# Patient Record
Sex: MALE | Race: WHITE | NOT HISPANIC OR LATINO | Employment: PART TIME | URBAN - METROPOLITAN AREA
[De-identification: names, ages, dates, MRNs, and addresses within clinical notes are randomized per-mention and may not be internally consistent; named-entity substitution may affect disease eponyms.]

---

## 2018-09-18 ENCOUNTER — APPOINTMENT (OUTPATIENT)
Dept: ULTRASOUND IMAGING | Facility: CLINIC | Age: 43
End: 2018-09-18
Attending: FAMILY MEDICINE
Payer: COMMERCIAL

## 2018-09-18 ENCOUNTER — HOSPITAL ENCOUNTER (EMERGENCY)
Facility: CLINIC | Age: 43
Discharge: ANOTHER HOSPITAL | End: 2018-09-18
Attending: FAMILY MEDICINE | Admitting: FAMILY MEDICINE
Payer: COMMERCIAL

## 2018-09-18 VITALS
TEMPERATURE: 98.1 F | DIASTOLIC BLOOD PRESSURE: 82 MMHG | SYSTOLIC BLOOD PRESSURE: 145 MMHG | HEART RATE: 60 BPM | WEIGHT: 228 LBS | RESPIRATION RATE: 18 BRPM | OXYGEN SATURATION: 93 % | BODY MASS INDEX: 34.67 KG/M2

## 2018-09-18 DIAGNOSIS — K80.42 CHOLEDOCHOLITHIASIS WITH ACUTE CHOLECYSTITIS: ICD-10-CM

## 2018-09-18 DIAGNOSIS — R10.13 ABDOMINAL PAIN, EPIGASTRIC: ICD-10-CM

## 2018-09-18 DIAGNOSIS — K85.10 ACUTE GALLSTONE PANCREATITIS: ICD-10-CM

## 2018-09-18 LAB
ALBUMIN SERPL-MCNC: 4.2 G/DL (ref 3.4–5)
ALP SERPL-CCNC: 156 U/L (ref 40–150)
ALT SERPL W P-5'-P-CCNC: 188 U/L (ref 0–70)
ANION GAP SERPL CALCULATED.3IONS-SCNC: 9 MMOL/L (ref 3–14)
AST SERPL W P-5'-P-CCNC: 62 U/L (ref 0–45)
BASOPHILS # BLD AUTO: 0.1 10E9/L (ref 0–0.2)
BASOPHILS NFR BLD AUTO: 0.3 %
BILIRUB SERPL-MCNC: 2.4 MG/DL (ref 0.2–1.3)
BUN SERPL-MCNC: 15 MG/DL (ref 7–30)
CALCIUM SERPL-MCNC: 8.9 MG/DL (ref 8.5–10.1)
CHLORIDE SERPL-SCNC: 104 MMOL/L (ref 94–109)
CO2 SERPL-SCNC: 27 MMOL/L (ref 20–32)
CREAT SERPL-MCNC: 0.93 MG/DL (ref 0.66–1.25)
DIFFERENTIAL METHOD BLD: ABNORMAL
EOSINOPHIL NFR BLD AUTO: 1 %
ERYTHROCYTE [DISTWIDTH] IN BLOOD BY AUTOMATED COUNT: 12.2 % (ref 10–15)
GFR SERPL CREATININE-BSD FRML MDRD: 88 ML/MIN/1.7M2
GLUCOSE SERPL-MCNC: 133 MG/DL (ref 70–99)
HCT VFR BLD AUTO: 53.6 % (ref 40–53)
HGB BLD-MCNC: 18.5 G/DL (ref 13.3–17.7)
IMM GRANULOCYTES # BLD: 0.1 10E9/L (ref 0–0.4)
IMM GRANULOCYTES NFR BLD: 0.4 %
LIPASE SERPL-CCNC: ABNORMAL U/L (ref 73–393)
LYMPHOCYTES # BLD AUTO: 2 10E9/L (ref 0.8–5.3)
LYMPHOCYTES NFR BLD AUTO: 9.8 %
MCH RBC QN AUTO: 29.6 PG (ref 26.5–33)
MCHC RBC AUTO-ENTMCNC: 34.5 G/DL (ref 31.5–36.5)
MCV RBC AUTO: 86 FL (ref 78–100)
MONOCYTES # BLD AUTO: 0.3 10E9/L (ref 0–1.3)
MONOCYTES NFR BLD AUTO: 1.4 %
NEUTROPHILS # BLD AUTO: 17.9 10E9/L (ref 1.6–8.3)
NEUTROPHILS NFR BLD AUTO: 87.1 %
NRBC # BLD AUTO: 0 10*3/UL
NRBC BLD AUTO-RTO: 0 /100
PLATELET # BLD AUTO: 295 10E9/L (ref 150–450)
POTASSIUM SERPL-SCNC: 3.7 MMOL/L (ref 3.4–5.3)
PROT SERPL-MCNC: 8 G/DL (ref 6.8–8.8)
RBC # BLD AUTO: 6.24 10E12/L (ref 4.4–5.9)
SODIUM SERPL-SCNC: 140 MMOL/L (ref 133–144)
WBC # BLD AUTO: 20.5 10E9/L (ref 4–11)

## 2018-09-18 PROCEDURE — 96375 TX/PRO/DX INJ NEW DRUG ADDON: CPT | Performed by: FAMILY MEDICINE

## 2018-09-18 PROCEDURE — 83690 ASSAY OF LIPASE: CPT | Performed by: FAMILY MEDICINE

## 2018-09-18 PROCEDURE — 99285 EMERGENCY DEPT VISIT HI MDM: CPT | Mod: 25 | Performed by: FAMILY MEDICINE

## 2018-09-18 PROCEDURE — 99285 EMERGENCY DEPT VISIT HI MDM: CPT | Mod: Z6 | Performed by: FAMILY MEDICINE

## 2018-09-18 PROCEDURE — 25000128 H RX IP 250 OP 636: Performed by: FAMILY MEDICINE

## 2018-09-18 PROCEDURE — 96376 TX/PRO/DX INJ SAME DRUG ADON: CPT | Performed by: FAMILY MEDICINE

## 2018-09-18 PROCEDURE — 85025 COMPLETE CBC W/AUTO DIFF WBC: CPT | Performed by: FAMILY MEDICINE

## 2018-09-18 PROCEDURE — 96361 HYDRATE IV INFUSION ADD-ON: CPT | Performed by: FAMILY MEDICINE

## 2018-09-18 PROCEDURE — 76705 ECHO EXAM OF ABDOMEN: CPT

## 2018-09-18 PROCEDURE — 96365 THER/PROPH/DIAG IV INF INIT: CPT | Performed by: FAMILY MEDICINE

## 2018-09-18 PROCEDURE — 80053 COMPREHEN METABOLIC PANEL: CPT | Performed by: FAMILY MEDICINE

## 2018-09-18 PROCEDURE — 96374 THER/PROPH/DIAG INJ IV PUSH: CPT | Performed by: FAMILY MEDICINE

## 2018-09-18 RX ORDER — HYDROMORPHONE HYDROCHLORIDE 1 MG/ML
0.5 INJECTION, SOLUTION INTRAMUSCULAR; INTRAVENOUS; SUBCUTANEOUS
Status: DISCONTINUED | OUTPATIENT
Start: 2018-09-18 | End: 2018-09-18 | Stop reason: HOSPADM

## 2018-09-18 RX ORDER — HYDROMORPHONE HYDROCHLORIDE 1 MG/ML
0.5 INJECTION, SOLUTION INTRAMUSCULAR; INTRAVENOUS; SUBCUTANEOUS
Status: COMPLETED | OUTPATIENT
Start: 2018-09-18 | End: 2018-09-18

## 2018-09-18 RX ORDER — ONDANSETRON 2 MG/ML
4 INJECTION INTRAMUSCULAR; INTRAVENOUS EVERY 30 MIN PRN
Status: COMPLETED | OUTPATIENT
Start: 2018-09-18 | End: 2018-09-18

## 2018-09-18 RX ORDER — KETOROLAC TROMETHAMINE 30 MG/ML
30 INJECTION, SOLUTION INTRAMUSCULAR; INTRAVENOUS ONCE
Status: COMPLETED | OUTPATIENT
Start: 2018-09-18 | End: 2018-09-18

## 2018-09-18 RX ADMIN — Medication 0.5 MG: at 19:52

## 2018-09-18 RX ADMIN — Medication 0.5 MG: at 17:44

## 2018-09-18 RX ADMIN — Medication 0.5 MG: at 17:13

## 2018-09-18 RX ADMIN — ONDANSETRON 4 MG: 2 INJECTION INTRAMUSCULAR; INTRAVENOUS at 17:03

## 2018-09-18 RX ADMIN — SODIUM CHLORIDE 1000 ML: 9 INJECTION, SOLUTION INTRAVENOUS at 14:19

## 2018-09-18 RX ADMIN — SODIUM CHLORIDE 1000 ML: 9 INJECTION, SOLUTION INTRAVENOUS at 17:47

## 2018-09-18 RX ADMIN — KETOROLAC TROMETHAMINE 30 MG: 30 INJECTION, SOLUTION INTRAMUSCULAR at 14:22

## 2018-09-18 RX ADMIN — PIPERACILLIN SODIUM,TAZOBACTAM SODIUM 4.5 G: 4; .5 INJECTION, POWDER, FOR SOLUTION INTRAVENOUS at 17:05

## 2018-09-18 RX ADMIN — Medication 0.5 MG: at 18:27

## 2018-09-18 RX ADMIN — ONDANSETRON 4 MG: 2 INJECTION INTRAMUSCULAR; INTRAVENOUS at 14:20

## 2018-09-18 NOTE — ED TRIAGE NOTES
"Pt c/o sever abd pain that started 4 hours ago.  Is getting worse.  States \"I think my gallbladder burst\".  Was told he need to have it removed years ago.  Pt also c/o N/V.  Pt pale, diaphoretic at triage.   "

## 2018-09-18 NOTE — ED NOTES
Pt resting on cart with SO at bedside.  Pt states his abd pain is a little better but would like additional dose.

## 2018-09-18 NOTE — ED PROVIDER NOTES
"                                                            [facility] ED Provider Note   CC:     Chief Complaint   Patient presents with     Abdominal Pain     HPI:  Clint Alvarez is a 43 year old male who presented to the emergency department with severe abdominal pain that started approximately 4 hours ago, with severe pain, nausea and vomiting ×4.  Patient was pale and diaphoretic at triage.  He was able to report that he had 3-4 attacks this past week.  He has a known history of gallbladder disease based on the previous ultrasound from 2012.  He has had increasing episodes over the last several months now.  Patient states that he does not go to the doctor often, and does not know whether he has a history of diabetes, hypertension, hyperlipidemia, etc.  He has had no previous abdominal surgeries.  He is a smoker of three-quarter packs of cigarettes per day and denies any alcohol or illicit drug use.  Patient's ultrasound from 2012 reveals cholelithiasis with gallbladder wall thickening and questionable small amount of pericholecystic fluid.  Patient reports that his bowel movements have been slightly watery today.  There has been no melena or hematochezia.  He rates his current pain level \"the worst it has ever been.\"  Patient describes pain across the epigastrium, wrapping around into the back.  There is been no alleviating or aggravating factors.  He had a bagel this morning before the symptoms started.    Problem List:  There are no active problems to display for this patient.      MEDS:   Previous Medications    No medications on file       ALLERGIES:    Allergies   Allergen Reactions     No Known Drug Allergy        Past Surgical History:   Procedure Laterality Date     INSERT CHEST TUBE      1994       Social History   Substance Use Topics     Smoking status: Current Every Day Smoker     Packs/day: 0.50     Years: 20.00     Smokeless tobacco: Never Used     Alcohol use No         Review of " Systems   Except as noted in HPI, all other systems were reviewed and are negative    Physical Exam     Vitals were reviewed  Patient Vitals for the past 8 hrs:   BP Temp Temp src Pulse Resp SpO2 Weight   09/18/18 1532 118/76 - - 60 18 100 % -   09/18/18 1347 119/78 98.1  F (36.7  C) Oral 64 20 100 % 103.4 kg (228 lb)     GENERAL APPEARANCE: Moderate to severe distress due to severe active vomiting  FACE: normal facies  EYES: Pupils are equal; sclerae nonicteric  HENT: normal external exam  NECK: no adenopathy or asymmetry  RESP: normal respiratory effort; clear breath sounds bilaterally  CV: regular rate and rhythm; no significant murmurs, gallops or rubs  ABD: soft, obese, epigastric and right upper quadrant tenderness; no rebound or guarding; bowel sounds are normal  MS: no gross deformities noted; normal muscle tone.  EXT: No calf tenderness or pitting edema  SKIN: no worrisome rash  NEURO: no facial droop; no focal deficits, speech is normal  PSYCH: normal mood and affect      Available Lab/Imaging Results     Results for orders placed or performed during the hospital encounter of 09/18/18 (from the past 24 hour(s))   CBC with platelets differential   Result Value Ref Range    WBC 20.5 (H) 4.0 - 11.0 10e9/L    RBC Count 6.24 (H) 4.4 - 5.9 10e12/L    Hemoglobin 18.5 (H) 13.3 - 17.7 g/dL    Hematocrit 53.6 (H) 40.0 - 53.0 %    MCV 86 78 - 100 fl    MCH 29.6 26.5 - 33.0 pg    MCHC 34.5 31.5 - 36.5 g/dL    RDW 12.2 10.0 - 15.0 %    Platelet Count 295 150 - 450 10e9/L    Diff Method Automated Method     % Neutrophils 87.1 %    % Lymphocytes 9.8 %    % Monocytes 1.4 %    % Eosinophils 1.0 %    % Basophils 0.3 %    % Immature Granulocytes 0.4 %    Nucleated RBCs 0 0 /100    Absolute Neutrophil 17.9 (H) 1.6 - 8.3 10e9/L    Absolute Lymphocytes 2.0 0.8 - 5.3 10e9/L    Absolute Monocytes 0.3 0.0 - 1.3 10e9/L    Absolute Basophils 0.1 0.0 - 0.2 10e9/L    Abs Immature Granulocytes 0.1 0 - 0.4 10e9/L    Absolute Nucleated  RBC 0.0    Comprehensive metabolic panel   Result Value Ref Range    Sodium 140 133 - 144 mmol/L    Potassium 3.7 3.4 - 5.3 mmol/L    Chloride 104 94 - 109 mmol/L    Carbon Dioxide 27 20 - 32 mmol/L    Anion Gap 9 3 - 14 mmol/L    Glucose 133 (H) 70 - 99 mg/dL    Urea Nitrogen 15 7 - 30 mg/dL    Creatinine 0.93 0.66 - 1.25 mg/dL    GFR Estimate 88 >60 mL/min/1.7m2    GFR Estimate If Black >90 >60 mL/min/1.7m2    Calcium 8.9 8.5 - 10.1 mg/dL    Bilirubin Total 2.4 (H) 0.2 - 1.3 mg/dL    Albumin 4.2 3.4 - 5.0 g/dL    Protein Total 8.0 6.8 - 8.8 g/dL    Alkaline Phosphatase 156 (H) 40 - 150 U/L     (H) 0 - 70 U/L    AST 62 (H) 0 - 45 U/L   Lipase   Result Value Ref Range    Lipase 31544 (H) 73 - 393 U/L   US Abdomen Limited    Narrative    LIMITED ABDOMINAL ULTRASOUND  9/18/2018 3:59 PM     HISTORY: Right upper quadrant abdominal pain.     COMPARISON: None.    FINDINGS: This study is significantly limited due to difficulty  penetrating through the liver due to fatty liver, due to patient body  habitus and due to bowel gas.   Gallbladder: There is asymmetric thickening of the wall of the  gallbladder measuring up to 0.6 cm in thickness. It is somewhat  irregular. There is a calculus in the gallbladder neck dependently  located measuring up to 1.2 cm. This does appear to cause some  shadowing, but the technologist states that it was not significant  shadowing. Small polyps are noted along the posterior wall of the  gallbladder do not cause significant shadowing. These measure less  than 0.3 cm. Small nonshadowing calculus is seen in the gallbladder  neck measuring up to 0.6 cm. No pericholecystic fluid is identified.  No definite sonographic Rucker sign is seen. Patient described pain  over entire abdomen during this examination. Patient was on pain  medications at the time of the exam.    Bile ducts:  Common bile duct is somewhat difficult to see, but  measures up to 1.0 cm. No definite choledocholithiasis is  identified.    Liver: There is diffuse fatty infiltration of the liver. Liver is  limited in evaluation due to limitations described above.    Pancreas:  Completely obscured and could not be evaluated.    Right kidney:  Right renal contour is mildly lobulated which could  represent fetal lobulation or chronic scarring. Right kidney is  otherwise normal in appearance.    Aorta and Proximal IVC: Partially obscured. Visualized portions of the  abdominal aorta and proximal inferior vena cava are grossly  unremarkable.      Impression    IMPRESSION:   1. Irregular wall thickening of the gallbladder, cholelithiasis and  probable gallbladder polyps as described above. No definite  sonographic Rucker sign or pericholecystic fluid to confirm acute  cholecystitis. Patient described pain over the entire upper abdomen  during the exam. This was not specifically limited to the region of  the gallbladder. Gallbladder wall thickening is a differential  diagnosis including acute cholecystitis, hypoalbuminemia, congestive  heart failure, hepatitis as well as others. Recommend clinical  correlation.  2. Diffuse fatty infiltration of the liver.  3. Limited exam due to patient body habitus, decreased penetration of  the liver (fatty infiltration) and bowel gas. Pancreas is completely  obscured and could not be evaluated.    I discussed the abnormal findings in the gallbladder, fatty  infiltration of the liver and limitations of this study with Dr. Catherine  on 9/18/2018 at approximately 4:20 PM.                Impression     Final diagnoses:   Abdominal pain, epigastric   Choledocholithiasis with acute cholecystitis   Acute gallstone pancreatitis         ED Course & Medical Decision Making   Clint Alvarez is a 43 year old male who presented to the emergency department with acute onset of epigastric and right upper quadrant pain that started 4 hours ago.  Patient reports that this is the most severe pain he has ever had.  He has had 3-4  episodes over the past week.  Patient states that he has had increasing episodes over the last several months, with a known history of gallbladder disease.  He had an ultrasound that was documented in our system in 2012, and at that time he had known cholelithiasis with suspected acute cholecystitis with some gallbladder wall thickening.  He reported that he was doing well for some time, but his symptoms started to recur periodically.  Over the last several months symptoms have been occurring more randomly.  Patient was seen shortly after arrival.  Temp is 98.1 with blood pressure 119/78.  Patient appear to be in severe distress with active nausea and vomiting.  He threw up some yellow stomach fluid.  Patient has severe epigastric and right upper quadrant tenderness.  His workup reveals an elevated white blood count of 20.5 with 87% neutrophils.  Hemoglobin is 18.5.  Competency metabolic panel reveals normal electrolytes, elevated glucose of 133, total bilirubin of 2.4, ALT of 188 and AST of 62.  Patient's lipase level is 91,148.  Right upper quadrant ultrasound was performed and reveals an irregular wall thickening of the gallbladder, cholelithiasis and probable gallbladder polyps.  There is a calculus in the gallbladder neck dependently located measuring up to 1.2 cm.  The asymmetric thickening of the wall of the gallbladder measures up to 0.6 cm.  There is a small nonshadowing calculus in the gallbladder neck measuring up to 0.6 cm.  The common bile duct is dilated and measures up to 1.0 cm.  Patient is diffuse fatty infiltration of the liver.  The exam is overall limited due to the patient's body habitus and decreased penetration of the liver.  Patient was started on Zosyn for suspected acute cholecystitis in the setting of choledocholithiasis and gallstone pancreatitis.  I discussed the case with Dr. Nelson and he recommended that we transfer the patient for possible ERCP.  I discussed with the patient options  of transfer including the [facility] with whom we are affiliated with or even [facility].  Patient lives in [place] and wanted to go to a closer hospital.  Unfortunately, [facility] was his first choice, and they are on divert for adult patients.  I reviewed the options again with the patient and he wanted us to try to contact [facility] next.  Patient is getting redosed on Zofran due to recurrent nausea when he got up.  Patient is received Toradol, IV fluids, Zofran and Dilaudid.  He is currently receiving Zosyn.    5:56 PM: I discussed the case with Dr. Whit Mai, hospitalist at [facility], who has accepted care for the patient.  She wanted the patient to continue with 500 mL's per hour of normal saline.  Patient will be transferred by ALS ambulance shortly to their MedSurg unit.         At [facility] ED to [facility] inpatient Handoff:    Discussed with Dr. Whit Mai at [facility]  Patient accepted for Inpatient Stay  Pending studies include None; IV fluids, Zofran, IV Dilaudid; Zosyn 4.5 g infusing  Code Status: Full Code         Medications   piperacillin-tazobactam (ZOSYN) 4.5 g in sodium chloride 0.9 % 100 mL intermittent infusion (4.5 g Intravenous New Bag 9/18/18 1705)   HYDROmorphone (PF) (DILAUDID) injection 0.5 mg (0.5 mg Intravenous Given 9/18/18 1744)   0.9% sodium chloride BOLUS (0 mLs Intravenous Stopped 9/18/18 1532)   ketorolac (TORADOL) injection 30 mg (30 mg Intravenous Given 9/18/18 1422)   ondansetron (ZOFRAN) injection 4 mg (4 mg Intravenous Given 9/18/18 1703)   0.9% sodium chloride BOLUS (1,000 mLs Intravenous New Bag 9/18/18 1747)            This note was dictated using electronic voice recognition software and although reviewed, may contain grammatical and spelling errors.        Cora Catherine MD  09/18/18 [id number]

## 2018-09-18 NOTE — ED NOTES
Pt up to the bathroom via wheelchair.  Pt had return of nausea, but did not vomit.  Pt also reports worsening of pain.

## 2018-10-11 ENCOUNTER — TELEPHONE (OUTPATIENT)
Dept: FAMILY MEDICINE | Facility: OTHER | Age: 43
End: 2018-10-11

## 2018-10-11 ENCOUNTER — OFFICE VISIT (OUTPATIENT)
Dept: FAMILY MEDICINE | Facility: OTHER | Age: 43
End: 2018-10-11
Payer: COMMERCIAL

## 2018-10-11 VITALS
BODY MASS INDEX: 35.61 KG/M2 | SYSTOLIC BLOOD PRESSURE: 130 MMHG | OXYGEN SATURATION: 95 % | HEIGHT: 67 IN | DIASTOLIC BLOOD PRESSURE: 80 MMHG | TEMPERATURE: 98.5 F | HEART RATE: 80 BPM | WEIGHT: 226.9 LBS | RESPIRATION RATE: 20 BRPM

## 2018-10-11 DIAGNOSIS — R53.83 FATIGUE, UNSPECIFIED TYPE: ICD-10-CM

## 2018-10-11 DIAGNOSIS — L20.89 OTHER ATOPIC DERMATITIS: Primary | ICD-10-CM

## 2018-10-11 DIAGNOSIS — L20.9 ATOPIC DERMATITIS, UNSPECIFIED TYPE: Primary | ICD-10-CM

## 2018-10-11 LAB
ANION GAP SERPL CALCULATED.3IONS-SCNC: 8 MMOL/L (ref 3–14)
BASOPHILS # BLD AUTO: 0.1 10E9/L (ref 0–0.2)
BASOPHILS NFR BLD AUTO: 1 %
BUN SERPL-MCNC: 15 MG/DL (ref 7–30)
CALCIUM SERPL-MCNC: 8.6 MG/DL (ref 8.5–10.1)
CHLORIDE SERPL-SCNC: 105 MMOL/L (ref 94–109)
CO2 SERPL-SCNC: 28 MMOL/L (ref 20–32)
CREAT SERPL-MCNC: 0.9 MG/DL (ref 0.66–1.25)
DIFFERENTIAL METHOD BLD: NORMAL
EOSINOPHIL # BLD AUTO: 0.4 10E9/L (ref 0–0.7)
EOSINOPHIL NFR BLD AUTO: 3.9 %
ERYTHROCYTE [DISTWIDTH] IN BLOOD BY AUTOMATED COUNT: 12.4 % (ref 10–15)
GFR SERPL CREATININE-BSD FRML MDRD: >90 ML/MIN/1.7M2
GLUCOSE SERPL-MCNC: 121 MG/DL (ref 70–99)
HBA1C MFR BLD: 5.7 % (ref 0–5.6)
HCT VFR BLD AUTO: 42.5 % (ref 40–53)
HGB BLD-MCNC: 14.5 G/DL (ref 13.3–17.7)
LYMPHOCYTES # BLD AUTO: 2.9 10E9/L (ref 0.8–5.3)
LYMPHOCYTES NFR BLD AUTO: 28.6 %
MCH RBC QN AUTO: 29.1 PG (ref 26.5–33)
MCHC RBC AUTO-ENTMCNC: 34.1 G/DL (ref 31.5–36.5)
MCV RBC AUTO: 85 FL (ref 78–100)
MONOCYTES # BLD AUTO: 1 10E9/L (ref 0–1.3)
MONOCYTES NFR BLD AUTO: 9.5 %
NEUTROPHILS # BLD AUTO: 5.8 10E9/L (ref 1.6–8.3)
NEUTROPHILS NFR BLD AUTO: 57 %
PLATELET # BLD AUTO: 399 10E9/L (ref 150–450)
POTASSIUM SERPL-SCNC: 3.9 MMOL/L (ref 3.4–5.3)
RBC # BLD AUTO: 4.99 10E12/L (ref 4.4–5.9)
SODIUM SERPL-SCNC: 141 MMOL/L (ref 133–144)
WBC # BLD AUTO: 10.2 10E9/L (ref 4–11)

## 2018-10-11 PROCEDURE — 83036 HEMOGLOBIN GLYCOSYLATED A1C: CPT | Performed by: PHYSICIAN ASSISTANT

## 2018-10-11 PROCEDURE — 80048 BASIC METABOLIC PNL TOTAL CA: CPT | Performed by: PHYSICIAN ASSISTANT

## 2018-10-11 PROCEDURE — 99213 OFFICE O/P EST LOW 20 MIN: CPT | Performed by: PHYSICIAN ASSISTANT

## 2018-10-11 PROCEDURE — 36415 COLL VENOUS BLD VENIPUNCTURE: CPT | Performed by: PHYSICIAN ASSISTANT

## 2018-10-11 PROCEDURE — 85025 COMPLETE CBC W/AUTO DIFF WBC: CPT | Performed by: PHYSICIAN ASSISTANT

## 2018-10-11 RX ORDER — CIPROFLOXACIN HYDROCHLORIDE 3.5 MG/ML
SOLUTION/ DROPS TOPICAL
Qty: 1 BOTTLE | Refills: 0 | Status: SHIPPED | OUTPATIENT
Start: 2018-10-11 | End: 2018-10-26

## 2018-10-11 RX ORDER — BENZOCAINE/MENTHOL 6 MG-10 MG
LOZENGE MUCOUS MEMBRANE
Qty: 30 G | Refills: 0 | Status: SHIPPED | OUTPATIENT
Start: 2018-10-11 | End: 2019-08-20

## 2018-10-11 RX ORDER — CIPROFLOXACIN AND DEXAMETHASONE 3; 1 MG/ML; MG/ML
4 SUSPENSION/ DROPS AURICULAR (OTIC) 2 TIMES DAILY
Qty: 2.5 ML | Refills: 0 | Status: SHIPPED | OUTPATIENT
Start: 2018-10-11 | End: 2019-02-28

## 2018-10-11 ASSESSMENT — PAIN SCALES - GENERAL: PAINLEVEL: MILD PAIN (3)

## 2018-10-11 NOTE — PATIENT INSTRUCTIONS
1. Cetirizine, fexofenadine or Loratadine 1 tablet daily for itching  2. Hydrocortisone cream applied outside of ears sparingly twice daily  3. Ciprodex 1 drop in each ear twice daily for 7 days for itching     Atopic Dermatitis (Adult)  Atopic dermatitis is a dry, itchy, red rash. It s also called eczema. The rash is chronic, or ongoing. It can come and go over time. The disease is often passed down in families. It causes a problem with the skin barrier that makes the skin more sensitive to the environment and other factors. The increased skin sensitivity causes an itch, which causes scratching. Scratching can worsen the itching or also break the skin. This can put the skin at risk of infection.  The condition is most common in people with asthma, hay fever, hives, or dry or sensitive skin. The rash may be caused by extreme heat or heavy sweating. Skin irritants can cause the rash to flare up. These can include wool or silk clothing, grease, oils, some medicines, and harsh soaps and detergents. Emotional stress can also be a trigger.  Treatment is done to relieve the itching and inflammation of the skin.  Home care  Follow these tips to care for your condition:    Keep the areas of rash clean by bathing at least every other day. Use lukewarm water to bathe. Don t use hot water, which can dry out the skin.    Don t use soaps with strong detergents. Use mild soaps made for sensitive skin.    Apply a cream or ointment to damp skin right after bathing.    Avoid things that irritate your skin. Wear absorbent, soft fabrics next to the skin rather than rough or scratchy materials.    Use mild laundry soap free of scents and perfumes. Make sure to rinse all the soap out of your clothes.    Treat any skin infection as directed.    Use oral diphenhydramine to help reduce itching. This is an antihistamine you can buy at drug and grocery stores. It can make you sleepy, so use lower doses during the daytime. Or you can use  loratadine. This is an antihistamine that will not make you sleepy. Do not use diphenhydramine if you have glaucoma or have trouble urinating due to an enlarged prostate.  Follow-up care  See your healthcare provider, or as advised. If your symptoms don t get better or if they get worse in the next 7 days, make an appointment with your healthcare provider.  When to seek medical advice  Call your healthcare provider right away  if any of these occur:    Increasing area of redness or pain in the skin    Yellow crusts or wet drainage from the rash    Fever of 100.4 F (38 C) or higher, or as directed by your healthcare provider  Date Last Reviewed: 9/1/2016 2000-2017 [employer]. [address] [place] [address], [place] [address]. All rights reserved. This information is not intended as a substitute for professional medical care. Always follow your healthcare professional's instructions.        Managing Atopic Dermatitis (Eczema)     After bathing, gently pat your skin dry (don t rub). Apply moisturizer while your skin is still damp.   To manage your symptoms and help reduce the severity and frequency, try these self-care tips:  Caring for your skin    Use a gentle, fragrance-free cleanser (or nonsoap cleanser) for bathing. Rinse well. Pat skin dry.    Take warm, not hot, baths or showers. Try to limit them to no more that 10 to 15 minutes.     Use moisturizer liberally right after you bathe, while your skin is still damp.    Avoid scratching because it will cause more damage to your skin.     Topical, over-the-counter hydrocortisone cream may help control mild symptoms.   Controlling your environment    Avoid extreme heat or cold.    Avoid very humid or very dry air.    If your home or office air is very dry, use a humidifier.    Avoid allergens, such as dust, that may be present in bedding, carpets, plush toys, or rugs.    Know that pet hair and dander can cause flare-ups.  Seeking medical treatment  Another  way to keep symptoms under control is to seek medical treatment. Talk with your healthcare provider about the type of treatment that may work best for you. Your provider may prescribe treatments such as the following:    Topical treatments to put on the skin daily    Medicines taken by mouth (oral medicines), such as antihistamines, antibiotics, or corticosteroids    In severe cases shots (injections) may be needed to control the symptoms. You may even need antibiotics if skin infections occur.  Treatments don t work the same way for every person. So if your symptoms continue or get worse, ask your healthcare provider about other treatments.  Making lifestyle choices    Manage the stress in your life.    Wear loose-fitting cotton clothing that does not bind or rub your skin.    Avoid contact with wool or other scratchy fabrics.    Use fragrance-free products.  Getting good results  Now that you know more about atopic dermatitis, the next step is up to you. Follow your healthcare provider s treatment plan and your self-care routine. This will help bring atopic dermatitis under control. If your symptoms persist, be sure to let your health care provider know.   Date Last Reviewed: 2/1/2017 2000-2017 [employer]. [address] [place] [id number]. All rights reserved. This information is not intended as a substitute for professional medical care. Always follow your healthcare professional's instructions.

## 2018-10-11 NOTE — PROGRESS NOTES
SUBJECTIVE:   Clint Alvarez is a 43 year old male who presents to clinic today for the following health issues:      Rash  Onset: 2-3 months    Description:   Location: behind bilateral ears  Character: blotchy, raised, draining, red  Itching (Pruritis): YES    Progression of Symptoms:  worsening    Accompanying Signs & Symptoms:  Fever: no   Body aches or joint pain: no   Sore throat symptoms: no   Recent cold symptoms: no     History:   Previous similar rash: no     Precipitating factors:   Exposure to similar rash: no   New exposures: None   Recent travel: no     Alleviating factors:  nothing    Therapies Tried and outcome: lotion; no relief    Patient is a 43 year old male who presents with his significant other to discuss ongoing concerns about rash behind his ears and itching within the canals. He said that the rash has waxed/waned for 2-3 months. At times is has weeped/drained and others felt bumpy. He admits to itching the insides of his ears with car keys or a qtip. I discouraged this as it can increase the risk for infection or trauma. Denies changes in hearing, fever, recent illness, rash elsewhere, exposure to chemicals or new cleaning materials.     Problem list and histories reviewed & adjusted, as indicated.  Additional history: as documented    There is no problem list on file for this patient.    Past Surgical History:   Procedure Laterality Date     INSERT CHEST TUBE      1994       Social History   Substance Use Topics     Smoking status: Former Smoker     Packs/day: 0.50     Years: 20.00     Quit date: 9/18/2018     Smokeless tobacco: Never Used     Alcohol use No     History reviewed. No pertinent family history.      Current Outpatient Prescriptions   Medication Sig Dispense Refill     ciprofloxacin-dexamethasone (CIPRODEX) otic suspension Place 4 drops into both ears 2 times daily for 7 days 2.5 mL 0     hydrocortisone (CORTAID) 1 % cream Apply sparingly to affected area twice daily for  "14 days. 30 g 0     ciprofloxacin (CILOXAN) 0.3 % ophthalmic solution 1 drop each ear daily for 5 days 1 Bottle 0     dexamethasone (DECADRON) 0.1 % ophthalmic susp 1 drop in each ear daily for 5 days 5 mL 0     Allergies   Allergen Reactions     No Known Drug Allergy        Reviewed and updated as needed this visit by clinical staff  Tobacco  Allergies  Meds  Med Hx  Surg Hx  Fam Hx  Soc Hx      Reviewed and updated as needed this visit by Provider         ROS:  Constitutional, HEENT, cardiovascular, pulmonary, gi and gu systems are negative, except as otherwise noted.    OBJECTIVE:     /80 (BP Location: Right arm, Patient Position: Chair, Cuff Size: Adult Large)  Pulse 80  Temp 98.5  F (36.9  C) (Oral)  Resp 20  Ht 5' 6.75\" (1.695 m)  Wt 226 lb 14.4 oz (102.9 kg)  SpO2 95%  BMI 35.8 kg/m2  Body mass index is 35.8 kg/(m^2).  GENERAL: healthy, alert and no distress  HENT: ear canals erythematous and flaky and TM's normal, nose and mouth without ulcers or lesions  NECK: no adenopathy, no asymmetry, masses, or scars and thyroid normal to palpation  RESP: lungs clear to auscultation - no rales, rhonchi or wheezes  CV: regular rate and rhythm, normal S1 S2, no S3 or S4, no murmur, click or rub, no peripheral edema and peripheral pulses strong  SKIN: fissured, crusted skin behind ears without obvious signs of drainage, nontender and cool to touch  NEURO: Normal strength and tone, mentation intact and speech normal  PSYCH: mentation appears normal, affect normal/bright    Diagnostic Test Results:  none     ASSESSMENT/PLAN:   1. Atopic dermatitis, unspecified type  Suspect the patient's rash is due to eczematous reaction. I recommended use of topical steroid twice daily for next 2 weeks and drops in ears. Patient also encouraged to begin taking antihistamine to reduce itching.   - ciprofloxacin-dexamethasone (CIPRODEX) otic suspension; Place 4 drops into both ears 2 times daily for 7 days  Dispense: 2.5 " mL; Refill: 0  - hydrocortisone (CORTAID) 1 % cream; Apply sparingly to affected area twice daily for 14 days.  Dispense: 30 g; Refill: 0    2. Fatigue, unspecified type  We will check labs today to ensure no alternative cause for fatigue.   - CBC with platelets differential  - Basic metabolic panel  (Ca, Cl, CO2, Creat, Gluc, K, Na, BUN)  - Hemoglobin A1c    Follow up with clinic as needed or sooner if conditions change, worsen or fail to improve as expected.      Israel Matthews PA-C  [facility]

## 2018-10-11 NOTE — NURSING NOTE
Health Maintenance Due   Topic Date Due     PHQ-2 Q1 YR  07/07/1985     TETANUS IMMUNIZATION (SYSTEM ASSIGNED)  07/07/1991     HIV SCREEN (SYSTEM ASSIGNED)  07/07/1991     LIPID SCREEN Q5 YR MALE (SYSTEM ASSIGNED)  07/07/2008     INFLUENZA VACCINE (1) 09/01/2018     Cally BOWENS LPN

## 2018-10-11 NOTE — MR AVS SNAPSHOT
After Visit Summary   10/11/2018    Clint Alvarez    MRN: [id number]           Patient Information     Date Of Birth          1975        Visit Information        Provider Department      10/11/2018 2:20 PM Israel Matthews PA-C [facility]        Today's Diagnoses     Atopic dermatitis, unspecified type    -  1      Care Instructions      1. Cetirizine, fexofenadine or Loratadine 1 tablet daily for itching  2. Hydrocortisone cream applied outside of ears sparingly twice daily  3. Ciprodex 1 drop in each ear twice daily for 7 days for itching     Atopic Dermatitis (Adult)  Atopic dermatitis is a dry, itchy, red rash. It s also called eczema. The rash is chronic, or ongoing. It can come and go over time. The disease is often passed down in families. It causes a problem with the skin barrier that makes the skin more sensitive to the environment and other factors. The increased skin sensitivity causes an itch, which causes scratching. Scratching can worsen the itching or also break the skin. This can put the skin at risk of infection.  The condition is most common in people with asthma, hay fever, hives, or dry or sensitive skin. The rash may be caused by extreme heat or heavy sweating. Skin irritants can cause the rash to flare up. These can include wool or silk clothing, grease, oils, some medicines, and harsh soaps and detergents. Emotional stress can also be a trigger.  Treatment is done to relieve the itching and inflammation of the skin.  Home care  Follow these tips to care for your condition:    Keep the areas of rash clean by bathing at least every other day. Use lukewarm water to bathe. Don t use hot water, which can dry out the skin.    Don t use soaps with strong detergents. Use mild soaps made for sensitive skin.    Apply a cream or ointment to damp skin right after bathing.    Avoid things that irritate your skin. Wear absorbent, soft fabrics next to the skin rather than  rough or scratchy materials.    Use mild laundry soap free of scents and perfumes. Make sure to rinse all the soap out of your clothes.    Treat any skin infection as directed.    Use oral diphenhydramine to help reduce itching. This is an antihistamine you can buy at drug and grocery stores. It can make you sleepy, so use lower doses during the daytime. Or you can use loratadine. This is an antihistamine that will not make you sleepy. Do not use diphenhydramine if you have glaucoma or have trouble urinating due to an enlarged prostate.  Follow-up care  See your healthcare provider, or as advised. If your symptoms don t get better or if they get worse in the next 7 days, make an appointment with your healthcare provider.  When to seek medical advice  Call your healthcare provider right away  if any of these occur:    Increasing area of redness or pain in the skin    Yellow crusts or wet drainage from the rash    Fever of 100.4 F (38 C) or higher, or as directed by your healthcare provider  Date Last Reviewed: 9/1/2016 2000-2017 [employer]. [address] [place] [address]. All rights reserved. This information is not intended as a substitute for professional medical care. Always follow your healthcare professional's instructions.        Managing Atopic Dermatitis (Eczema)     After bathing, gently pat your skin dry (don t rub). Apply moisturizer while your skin is still damp.   To manage your symptoms and help reduce the severity and frequency, try these self-care tips:  Caring for your skin    Use a gentle, fragrance-free cleanser (or nonsoap cleanser) for bathing. Rinse well. Pat skin dry.    Take warm, not hot, baths or showers. Try to limit them to no more that 10 to 15 minutes.     Use moisturizer liberally right after you bathe, while your skin is still damp.    Avoid scratching because it will cause more damage to your skin.     Topical, over-the-counter hydrocortisone cream may  help control mild symptoms.   Controlling your environment    Avoid extreme heat or cold.    Avoid very humid or very dry air.    If your home or office air is very dry, use a humidifier.    Avoid allergens, such as dust, that may be present in bedding, carpets, plush toys, or rugs.    Know that pet hair and dander can cause flare-ups.  Seeking medical treatment  Another way to keep symptoms under control is to seek medical treatment. Talk with your healthcare provider about the type of treatment that may work best for you. Your provider may prescribe treatments such as the following:    Topical treatments to put on the skin daily    Medicines taken by mouth (oral medicines), such as antihistamines, antibiotics, or corticosteroids    In severe cases shots (injections) may be needed to control the symptoms. You may even need antibiotics if skin infections occur.  Treatments don t work the same way for every person. So if your symptoms continue or get worse, ask your healthcare provider about other treatments.  Making lifestyle choices    Manage the stress in your life.    Wear loose-fitting cotton clothing that does not bind or rub your skin.    Avoid contact with wool or other scratchy fabrics.    Use fragrance-free products.  Getting good results  Now that you know more about atopic dermatitis, the next step is up to you. Follow your healthcare provider s treatment plan and your self-care routine. This will help bring atopic dermatitis under control. If your symptoms persist, be sure to let your health care provider know.   Date Last Reviewed: 2/1/2017 2000-2017 [employer]. [address] [place] [address], [place] [address]. All rights reserved. This information is not intended as a substitute for professional medical care. Always follow your healthcare professional's instructions.                Follow-ups after your visit        Your next 10 appointments already scheduled     Oct 26, 2018  2:40 PM CDT  "  PHYSICAL with Israel Matthews PA-C   [facility]    [address] [facility] [address] [phone]              Who to contact     If you have questions or need follow up information about today's clinic visit or your schedule please contact [facility] directly at [phone].  Normal or non-critical lab and imaging results will be communicated to you by MyChart, letter or phone within 4 business days after the clinic has received the results. If you do not hear from us within 7 days, please contact the clinic through MyChart or phone. If you have a critical or abnormal lab result, we will notify you by phone as soon as possible.  Submit refill requests through [employer] or call your pharmacy and they will forward the refill request to us. Please allow 3 business days for your refill to be completed.          Additional Information About Your Visit        Remember [employer] Information     [employer] lets you send messages to your doctor, view your test results, renew your prescriptions, schedule appointments and more. To sign up, go to [email or web address] . Click on \"Log in\" on the left side of the screen, which will take you to the Welcome page. Then click on \"Sign up Now\" on the right side of the page.     You will be asked to enter the access code listed below, as well as some personal information. Please follow the directions to create your username and password.     Your access code is: [email or web address]  Expires: 2018  8:20 PM     Your access code will  in 90 days. If you need help or a new code, please call your [facility] clinic or [phone].        Care EveryWhere ID     This is your Care EveryWhere ID. This could be used by other organizations to access your [facility] medical records  [id number]        Your Vitals Were     Pulse Temperature Respirations Height Pulse Oximetry BMI (Body Mass Index)    80 98.5  F (36.9  C) (Oral) 20 5' 6.75\" " (1.695 m) 95% 35.8 kg/m2       Blood Pressure from Last 3 Encounters:   10/11/18 130/80   09/18/18 145/82   05/05/14 120/68    Weight from Last 3 Encounters:   10/11/18 226 lb 14.4 oz (102.9 kg)   09/18/18 228 lb (103.4 kg)   05/04/14 226 lb (102.5 kg)              Today, you had the following     No orders found for display         Today's Medication Changes          These changes are accurate as of 10/11/18  2:43 PM.  If you have any questions, ask your nurse or doctor.               Start taking these medicines.        Dose/Directions    ciprofloxacin-dexamethasone otic suspension   Commonly known as:  CIPRODEX   Used for:  Atopic dermatitis, unspecified type   Started by:  Israel Matthews PA-C        Dose:  4 drop   Place 4 drops into both ears 2 times daily for 7 days   Quantity:  2.5 mL   Refills:  0       hydrocortisone 1 % cream   Commonly known as:  CORTAID   Used for:  Atopic dermatitis, unspecified type   Started by:  Israel Matthews PA-C        Apply sparingly to affected area twice daily for 14 days.   Quantity:  30 g   Refills:  0            Where to get your medicines      These medications were sent to [id number] - [place], [place] - [address] [place] [id number]    Hours:  M-F 8:30-6:30; Sat 9-4; closed Sunday Phone:  [phone]     ciprofloxacin-dexamethasone otic suspension    hydrocortisone 1 % cream                Primary Care Provider Office Phone # Fax #    [facility] [phone]       [address] [facility] [address]        Equal Access to Services     [facility] ANGEL AH: Hadii bisi parham hadasho Soomaali, waaxda luqadaha, qaybta kaalmada adeegyada, cindy velazco. So [facility] [phone].    ATENCIÓN: Si habla español, tiene a khalil disposición servicios gratuitos de asistencia lingüística. Analia mcdaniel [phone].    We comply with applicable federal civil rights laws and [place] laws. We do not discriminate on the  basis of race, color, national origin, age, disability, sex, sexual orientation, or gender identity.            Thank you!     Thank you for choosing [facility]  for your care. Our goal is always to provide you with excellent care. Hearing back from our patients is one way we can continue to improve our services. Please take a few minutes to complete the written survey that you may receive in the mail after your visit with us. Thank you!             Your Updated Medication List - Protect others around you: Learn how to safely use, store and throw away your medicines at www.disposemymeds.org.          This list is accurate as of 10/11/18  2:43 PM.  Always use your most recent med list.                   Brand Name Dispense Instructions for use Diagnosis    ciprofloxacin-dexamethasone otic suspension    CIPRODEX    2.5 mL    Place 4 drops into both ears 2 times daily for 7 days    Atopic dermatitis, unspecified type       hydrocortisone 1 % cream    CORTAID    30 g    Apply sparingly to affected area twice daily for 14 days.    Atopic dermatitis, unspecified type

## 2018-10-11 NOTE — LETTER
[facility]  [address] [facility] [address] [phone]        October 15, 2018    Clint Alvarez  [id number]  [place] [address]          Dear Clint,    Your results return normal. The cause of your fatigue is still unclear. I recommend completion of the treatments for the dermatitis first while continuing to monitor symptoms for worsening, changes or new symptoms. If fatigue persists with resolution of the rash you should contact clinic to update on what the current symptoms are, frequency and other details.     If you have any questions please give us a call at the clinic.    Sincerely,        Israel Matthews PA-C/MALU bergman

## 2018-10-11 NOTE — TELEPHONE ENCOUNTER
Reason for Call:  Medication or medication refill:    Do you use a [facility]?  Name of the pharmacy and phone number for the current request:  [facility] - [phone]    Name of the medication requested: Patient isnt cover with the otic suspension-requesting to get two separate medications. Ciprofloxacin 0.3%, Dexamethazone 0.01% otic suspension     Other request:     Can we leave a detailed message on this number? YES    Phone number patient can be reached at: Home number on file [phone] (home)    Best Time: any    Call taken on 10/11/2018 at 2:47 PM by Danay Samson

## 2018-10-12 ENCOUNTER — TELEPHONE (OUTPATIENT)
Dept: FAMILY MEDICINE | Facility: OTHER | Age: 43
End: 2018-10-12

## 2018-10-12 NOTE — TELEPHONE ENCOUNTER
I left a call back message.    I called this patient to inform him of the following per Israel Matthews PA-C:  inform him that his lab results return normal, the cause of his fatigue is still unclear. I recommend completion of the treatments for the dermatitis first while continuing to monitor symptoms for worsening, changes or new symptoms. If fatigue persists with resolution of the rash he should contact clinic to update on what the current symptoms are, frequency and other details.     Somehow we were disconnected and then I left the call back message.

## 2018-10-12 NOTE — TELEPHONE ENCOUNTER
----- Message from Israel Matthews PA-C sent at 10/12/2018 10:41 AM CDT -----  Please call the patient to inform him that his lab results return normal, the cause of his fatigue is still unclear. I recommend completion of the treatments for the dermatitis first while continuing to monitor symptoms for worsening, changes or new symptoms. If fatigue persists with resolution of the rash he should contact clinic to update on what the current symptoms are, frequency and other details.    Israel Matthews PA-C on 10/12/2018 at 10:41 AM

## 2018-10-26 ENCOUNTER — OFFICE VISIT (OUTPATIENT)
Dept: FAMILY MEDICINE | Facility: OTHER | Age: 43
End: 2018-10-26
Payer: COMMERCIAL

## 2018-10-26 VITALS
SYSTOLIC BLOOD PRESSURE: 132 MMHG | HEIGHT: 67 IN | RESPIRATION RATE: 16 BRPM | DIASTOLIC BLOOD PRESSURE: 80 MMHG | WEIGHT: 230.7 LBS | TEMPERATURE: 99 F | BODY MASS INDEX: 36.21 KG/M2 | HEART RATE: 76 BPM

## 2018-10-26 DIAGNOSIS — Z23 NEED FOR PROPHYLACTIC VACCINATION AND INOCULATION AGAINST INFLUENZA: ICD-10-CM

## 2018-10-26 DIAGNOSIS — Z00.00 PREVENTATIVE HEALTH CARE: Primary | ICD-10-CM

## 2018-10-26 DIAGNOSIS — F34.1 DYSTHYMIA: ICD-10-CM

## 2018-10-26 LAB
CHOLEST SERPL-MCNC: 211 MG/DL
HDLC SERPL-MCNC: 38 MG/DL
LDLC SERPL CALC-MCNC: ABNORMAL MG/DL
NONHDLC SERPL-MCNC: 173 MG/DL
TRIGL SERPL-MCNC: 498 MG/DL

## 2018-10-26 PROCEDURE — 36415 COLL VENOUS BLD VENIPUNCTURE: CPT | Performed by: PHYSICIAN ASSISTANT

## 2018-10-26 PROCEDURE — 99213 OFFICE O/P EST LOW 20 MIN: CPT | Mod: 25 | Performed by: PHYSICIAN ASSISTANT

## 2018-10-26 PROCEDURE — 80061 LIPID PANEL: CPT | Performed by: PHYSICIAN ASSISTANT

## 2018-10-26 PROCEDURE — 90471 IMMUNIZATION ADMIN: CPT | Performed by: PHYSICIAN ASSISTANT

## 2018-10-26 PROCEDURE — 90686 IIV4 VACC NO PRSV 0.5 ML IM: CPT | Performed by: PHYSICIAN ASSISTANT

## 2018-10-26 PROCEDURE — 99396 PREV VISIT EST AGE 40-64: CPT | Mod: 25 | Performed by: PHYSICIAN ASSISTANT

## 2018-10-26 ASSESSMENT — ENCOUNTER SYMPTOMS
PARESTHESIAS: 0
FREQUENCY: 0
FEVER: 0
HEMATURIA: 0
PALPITATIONS: 0
SORE THROAT: 0
MYALGIAS: 1
EYE PAIN: 0
HEMATOCHEZIA: 0
DYSURIA: 0
SHORTNESS OF BREATH: 0
COUGH: 0
HEARTBURN: 0
ABDOMINAL PAIN: 0
CONSTIPATION: 0
JOINT SWELLING: 0
DIZZINESS: 0
DIARRHEA: 0
NERVOUS/ANXIOUS: 1
CHILLS: 0
WEAKNESS: 0
HEADACHES: 0
NAUSEA: 0
ARTHRALGIAS: 1

## 2018-10-26 ASSESSMENT — PATIENT HEALTH QUESTIONNAIRE - PHQ9
SUM OF ALL RESPONSES TO PHQ QUESTIONS 1-9: 17
10. IF YOU CHECKED OFF ANY PROBLEMS, HOW DIFFICULT HAVE THESE PROBLEMS MADE IT FOR YOU TO DO YOUR WORK, TAKE CARE OF THINGS AT HOME, OR GET ALONG WITH OTHER PEOPLE: SOMEWHAT DIFFICULT
SUM OF ALL RESPONSES TO PHQ QUESTIONS 1-9: 17

## 2018-10-26 ASSESSMENT — PAIN SCALES - GENERAL: PAINLEVEL: NO PAIN (0)
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
## 2018-10-26 NOTE — NURSING NOTE
Health Maintenance Due   Topic Date Due     TETANUS IMMUNIZATION (SYSTEM ASSIGNED)  07/07/1991     HIV SCREEN (SYSTEM ASSIGNED)  07/07/1991     LIPID SCREEN Q5 YR MALE (SYSTEM ASSIGNED)  07/07/2008     INFLUENZA VACCINE (1) 09/01/2018     Cally BOWENS LPN

## 2018-10-26 NOTE — MR AVS SNAPSHOT
After Visit Summary   10/26/2018    Clint Alvarez    MRN: [id number]           Patient Information     Date Of Birth          1975        Visit Information        Provider Department      10/26/2018 2:40 PM Israel Matthews PA-C [facility]        Today's Diagnoses     Preventative health care    -  1    Dysthymia          Care Instructions      Preventive Health Recommendations  Male Ages 40 to 49    Yearly exam:             See your health care provider every year in order to  o   Review health changes.   o   Discuss preventive care.    o   Review your medicines if your doctor has prescribed any.    You should be tested each year for STDs (sexually transmitted diseases) if you re at risk.     Have a cholesterol test every 5 years.     Have a colonoscopy (test for colon cancer) if someone in your family has had colon cancer or polyps before age 50.     After age 45, have a diabetes test (fasting glucose). If you are at risk for diabetes, you should have this test every 3 years.      Talk with your health care provider about whether or not a prostate cancer screening test (PSA) is right for you.    Shots: Get a flu shot each year. Get a tetanus shot every 10 years.     Nutrition:    Eat at least 5 servings of fruits and vegetables daily.     Eat whole-grain bread, whole-wheat pasta and brown rice instead of white grains and rice.     Get adequate Calcium and Vitamin D.     Lifestyle    Exercise for at least 150 minutes a week (30 minutes a day, 5 days a week). This will help you control your weight and prevent disease.     Limit alcohol to one drink per day.     No smoking.     Wear sunscreen to prevent skin cancer.     See your dentist every six months for an exam and cleaning.              Follow-ups after your visit        Additional Services     PRIMARY CARE INTEGRATED BEHAVIORAL HEALTH REFERRAL       Services are provided by a Behavioral Health Clinican (BHC) for [facility] patients'  with co-occuring medical / behavioral health needs or mental health / substance use issues referred by Care Team Members (MTM and Care Coordinators)    Services can be provided in person / in clinic or telephonically for the [facility], [place], Integrated Primary Care, and Complex Mobile Care Clinic patients.      Telephone / Consultation support can be provided to Care Team Members for [facility] patients.    [facility] will respond to routine orders within 3-5 business days.  [facility] will provide telephonic support to referred patients as indicated.    ~~~~~~~~~~~~~~~~~~~~~~~~~~~~~~~~~~~~~~~~~~~~~~~~~~~~~~~~~~~~~~~    Care Team Member creating referral: Family medicine    REFERRAL REASON:    Identified mental health issue without effective or active treatment. Contemplative: Identifies a problem and need for treatment    Provide additional details for Behavioral Health Clinician to best meet patient's current needs: Patient has history of chronic dysthymia     Clinic Staff has discussed Behavioral Health Clinician Referral with the Patient/Caregiver: yes                  Who to contact     If you have questions or need follow up information about today's clinic visit or your schedule please contact [facility] directly at [phone].  Normal or non-critical lab and imaging results will be communicated to you by MyChart, letter or phone within 4 business days after the clinic has received the results. If you do not hear from us within 7 days, please contact the clinic through MyChart or phone. If you have a critical or abnormal lab result, we will notify you by phone as soon as possible.  Submit refill requests through [employer] or call your pharmacy and they will forward the refill request to us. Please allow 3 business days for your refill to be completed.          Additional Information About Your Visit        Care EveryWhere ID     This is your Care EveryWhere ID. This could be used by other  "organizations to access your [place] medical records  [id number]        Your Vitals Were     Pulse Temperature Respirations Height BMI (Body Mass Index)       76 99  F (37.2  C) (Oral) 16 5' 6.75\" (1.695 m) 36.4 kg/m2        Blood Pressure from Last 3 Encounters:   10/26/18 132/80   10/11/18 130/80   09/18/18 145/82    Weight from Last 3 Encounters:   10/26/18 230 lb 11.2 oz (104.6 kg)   10/11/18 226 lb 14.4 oz (102.9 kg)   09/18/18 228 lb (103.4 kg)              We Performed the Following     Basic metabolic panel  (Ca, Cl, CO2, Creat, Gluc, K, Na, BUN)     CBC with platelets differential     Hemoglobin A1c     Lipid Profile (Chol, Trig, HDL, LDL calc)     PRIMARY CARE INTEGRATED BEHAVIORAL HEALTH REFERRAL        Primary Care Provider Office Phone # Fax #    [facility] [phone]       [address] [address]        Equal Access to Services     LORA ORTIZ : Hadii bisi ku hadasho Soomaali, waaxda luqadaha, qaybta kaalmada adeegyada, cindy velez . So [facility] [phone].    ATENCIÓN: Si habla español, tiene a khalil disposición servicios gratuitos de asistencia lingüística. Llame al [phone].    We comply with applicable federal civil rights laws and [place] laws. We do not discriminate on the basis of race, color, national origin, age, disability, sex, sexual orientation, or gender identity.            Thank you!     Thank you for choosing [facility]  for your care. Our goal is always to provide you with excellent care. Hearing back from our patients is one way we can continue to improve our services. Please take a few minutes to complete the written survey that you may receive in the mail after your visit with us. Thank you!             Your Updated Medication List - Protect others around you: Learn how to safely use, store and throw away your medicines at www.disposemymeds.org.          This list is accurate as of 10/26/18  3:01 " PM.  Always use your most recent med list.                   Brand Name Dispense Instructions for use Diagnosis    dexamethasone 0.1 % ophthalmic susp    DECADRON    5 mL    1 drop in each ear daily for 5 days    Other atopic dermatitis       hydrocortisone 1 % cream    CORTAID    30 g    Apply sparingly to affected area twice daily for 14 days.    Atopic dermatitis, unspecified type

## 2018-10-29 ENCOUNTER — TELEPHONE (OUTPATIENT)
Dept: FAMILY MEDICINE | Facility: OTHER | Age: 43
End: 2018-10-29

## 2018-10-29 NOTE — LETTER
[facility]  [address] [facility] [address] [phone]        October 31, 2018    Clint Alvarez  [address]  [facility] [address]          Dear Clint,    Your cholesterol levels returned with elevated levels.  Your 10-year ASCVD risk score (Mounikagibran KATHLEEN Jr, et al., 2013) is: 3.3%, this is a representation of the risk for stroke or cardiac disease in the next 10 years. At this time no medication is recommended. Instead, lifestyle changes can help improve these results. Eating a diet that includes regular servings of vegetables and fruits and limits sugars/carbohydrates/fats/salts. Also, daily exercise for 30 minutes. This can be going for a walk, doing yard work, cleaning consistently for 30 minutes or other fun activities. We can recheck your lipids at a future appointment in 6-12 months.    We have attempted to call with no answer.    Sincerely,        Israel Matthews PA-C/[facility]

## 2018-10-29 NOTE — TELEPHONE ENCOUNTER
I left a call back message.         I am calling to inform him of the following per Israel Matthews PA-C:  cholesterol levels returned with elevated levels. His The 10-year ASCVD risk score (Johnson HEVER Jr, et al., 2013) is: 3.3%, this is a representation of the risk for stroke or cardiac disease in the next 10 years. At this time no medication is recommended. Instead, lifestyle changes can help improve these results. Eating a diet that includes regular servings of vegetables and fruits and limits sugars/carbohydrates/fats/salts. Also, daily exercise for 30 minutes. This can be going for a walk, doing yard work, cleaning consistently for 30 minutes or other fun activities. We can recheck your lipids at a future appointment in 6-12 months

## 2018-10-29 NOTE — TELEPHONE ENCOUNTER
----- Message from Israel Matthews PA-C sent at 10/27/2018  9:14 AM CDT -----  Please call with results. Please inform patient that his cholesterol levels returned with elevated levels. His The 10-year ASCVD risk score (Mounika KATHLEEN Jr, et al., 2013) is: 3.3%, this is a representation of the risk for stroke or cardiac disease in the next 10 years. At this time no medication is recommended. Instead, lifestyle changes can help improve these results. Eating a diet that includes regular servings of vegetables and fruits and limits sugars/carbohydrates/fats/salts. Also, daily exercise for 30 minutes. This can be going for a walk, doing yard work, cleaning consistently for 30 minutes or other fun activities. We can recheck your lipids at a future appointment in 6-12 months.          Israel Matthews PA-C

## 2018-10-30 NOTE — TELEPHONE ENCOUNTER
I have attempted to contact this patient by phone with the following results: left message to return my call on answering machine.  Nandini Hanley MA     10/30/2018

## 2018-11-09 PROBLEM — F34.1 DYSTHYMIA: Status: ACTIVE | Noted: 2018-10-26

## 2018-11-30 ENCOUNTER — TELEPHONE (OUTPATIENT)
Dept: FAMILY MEDICINE | Facility: OTHER | Age: 43
End: 2018-11-30

## 2018-11-30 NOTE — TELEPHONE ENCOUNTER
Reason for call:  Patient reporting a symptom    Symptom or request: tooth infection - neck is starting to hurt and has headaches.  Also, requesting a tetanus.    Duration (how long have symptoms been present): 3 - 4 weeks ago his tooth broke    Have you been treated for this before? No    Additional comments: Wondering if he could at least get some antibiotics until he finds a dentist that will take his insurance and is taking on new patients    Phone Number patient can be reached at:  Home number on file [phone] (home)    Best Time:  any    Can we leave a detailed message on this number:  YES    Call taken on 11/30/2018 at 4:19 PM by Tiana Roper

## 2018-12-01 NOTE — TELEPHONE ENCOUNTER
Please call the patient to encourage him to be seen if he feels that his tooth is infected. If he does not feel it is infected, but is causing discomfort/pain, then I recommend Orajel as this has a numbing agent built in to help with tooth aches and mouth sores.    Israel Matthews PA-C on 12/1/2018 at 9:29 AM

## 2018-12-03 NOTE — TELEPHONE ENCOUNTER
I called this patient with the following per Israel Matthews PA-C:  Please call the patient to encourage him to be seen if he feels that his tooth is infected. If he does not feel it is infected, but is causing discomfort/pain, then I recommend Orajel as this has a numbing agent built in to help with tooth aches and mouth sores.    He stated he will call back to schedule the appointment.

## 2018-12-04 ENCOUNTER — OFFICE VISIT (OUTPATIENT)
Dept: FAMILY MEDICINE | Facility: OTHER | Age: 43
End: 2018-12-04
Payer: COMMERCIAL

## 2018-12-04 VITALS
HEART RATE: 76 BPM | BODY MASS INDEX: 38.39 KG/M2 | WEIGHT: 243.3 LBS | SYSTOLIC BLOOD PRESSURE: 128 MMHG | DIASTOLIC BLOOD PRESSURE: 70 MMHG | RESPIRATION RATE: 16 BRPM | TEMPERATURE: 98 F

## 2018-12-04 DIAGNOSIS — K04.7 TOOTH INFECTION: Primary | ICD-10-CM

## 2018-12-04 PROCEDURE — 99213 OFFICE O/P EST LOW 20 MIN: CPT | Performed by: PHYSICIAN ASSISTANT

## 2018-12-04 ASSESSMENT — PAIN SCALES - GENERAL: PAINLEVEL: MILD PAIN (3)

## 2018-12-04 NOTE — PATIENT INSTRUCTIONS
"  Dental Abscess    An abscess is a pocket of pus at the tip of a tooth root in your jaw bone. It is caused by an infection at the root of the tooth. It can cause pain and swelling of the gum, cheek, or jaw. Pain may spread from the tooth to your ear or the area of your jaw on the same side. If the abscess isn t treated, it appears as a bubble or swelling on the gum near the tooth. The pressure that builds in this swelling is the source of the pain. More serious infections cause your face to swell.  An abscess can be caused by a crack in the tooth, a cavity, a gum infection, or a combination of these. Once the pulp of the tooth is exposed, bacteria can spread down the roots to the tip. If the bacteria are not stopped, they can damage the bone and soft tissue, and an abscess can form.  Home care  Follow these guidelines when caring for yourself at home:    Don't have hot and cold foods and drinks. Your tooth may be sensitive to changes in temperature. Don t chew on the side of the infected tooth.    If your tooth is chipped or cracked, or if there is a large open cavity, put oil of cloves directly on the tooth to relieve pain. You can buy oil of cloves at drugstores. Some pharmacies carry an over-the-counter \"toothache kit.\" This contains a paste that you can put on the exposed tooth to make it less sensitive.    Put a cold pack on your jaw over the sore area to help reduce pain.    You may use over-the-counter medicine to ease pain, unless another medicine was prescribed. If you have chronic liver or kidney disease, talk with your healthcare provider before using acetaminophen or ibuprofen. Also talk with your provider if you ve had a stomach ulcer or GI bleeding.    An antibiotic will be prescribed. Take it until finished, even if you are feeling better after a few days.  Follow-up care  Follow up with your dentist or an oral surgeon, or as advised. Once an infection occurs in a tooth, it will continue to be a " problem until the infection is drained. This is done through surgery or a root canal. Or you may need to have your tooth pulled.  Call 911  Call 911 if any of these occur:    Unusual drowsiness    Headache or stiff neck    Weakness or fainting    Difficulty swallowing, breathing, or opening your mouth    Swollen eyelids  When to seek medical advice  Call your healthcare provider right away if any of these occur:    Your face becomes more swollen or red    Pain gets worse or spreads to your neck    Fever of 100.4  F (38.0  C) or higher, or as directed by your healthcare provider    Pus drains from the tooth  Date Last Reviewed: 10/1/2016    1250-5385 [employer] [address] [place] [address], [place] [address]. All rights reserved. This information is not intended as a substitute for professional medical care. Always follow your healthcare professional's instructions.      Orajel OTC topical pain relief medication for dental irritation.     May continue to use NSAIDs or tylenol as needed for pain.     Mouth rinse with saline rinse - neutralizes helps to reduce progression    Maintain good dental hygiene - we discussed finger brushing

## 2018-12-04 NOTE — LETTER
My Depression Action Plan  Name: Clint Alvarez   Date of Birth 1975  Date: 12/4/2018    My doctor: Willam Vergara Hodges   My clinic: [place] [address] [facility] [address] [phone]          GREEN    ZONE   Good Control    What it looks like:     Things are going generally well. You have normal up s and down s. You may even feel depressed from time to time, but bad moods usually last less than a day.   What you need to do:  1. Continue to care for yourself (see self care plan)  2. Check your depression survival kit and update it as needed  3. Follow your physician s recommendations including any medication.  4. Do not stop taking medication unless you consult with your physician first.           YELLOW         ZONE Getting Worse    What it looks like:     Depression is starting to interfere with your life.     It may be hard to get out of bed; you may be starting to isolate yourself from others.    Symptoms of depression are starting to last most all day and this has happened for several days.     You may have suicidal thoughts but they are not constant.   What you need to do:     1. Call your care team, your response to treatment will improve if you keep your care team informed of your progress. Yellow periods are signs an adjustment may need to be made.     2. Continue your self-care, even if you have to fake it!    3. Talk to someone in your support network    4. Open up your depression survival kit           RED    ZONE Medical Alert - Get Help    What it looks like:     Depression is seriously interfering with your life.     You may experience these or other symptoms: You can t get out of bed most days, can t work or engage in other necessary activities, you have trouble taking care of basic hygiene, or basic responsibilities, thoughts of suicide or death that will not go away, self-injurious behavior.     What you need to do:  1. Call your care team and  request a same-day appointment. If they are not available (weekends or after hours) call your local crisis line, emergency room or 911.            Depression Self Care Plan / Survival Kit    Self-Care for Depression  Here s the deal. Your body and mind are really not as separate as most people think.  What you do and think affects how you feel and how you feel influences what you do and think. This means if you do things that people who feel good do, it will help you feel better.  Sometimes this is all it takes.  There is also a place for medication and therapy depending on how severe your depression is, so be sure to consult with your medical provider and/ or Behavioral Health Consultant if your symptoms are worsening or not improving.     In order to better manage my stress, I will:    Exercise  Get some form of exercise, every day. This will help reduce pain and release endorphins, the  feel good  chemicals in your brain. This is almost as good as taking antidepressants!  This is not the same as joining a gym and then never going! (they count on that by the way ) It can be as simple as just going for a walk or doing some gardening, anything that will get you moving.      Hygiene   Maintain good hygiene (Get out of bed in the morning, Make your bed, Brush your teeth, Take a shower, and Get dressed like you were going to work, even if you are unemployed).  If your clothes don't fit try to get ones that do.    Diet  I will strive to eat foods that are good for me, drink plenty of water, and avoid excessive sugar, caffeine, alcohol, and other mood-altering substances.  Some foods that are helpful in depression are: complex carbohydrates, B vitamins, flaxseed, fish or fish oil, fresh fruits and vegetables.    Psychotherapy  I agree to participate in Individual Therapy (if recommended).    Medication  If prescribed medications, I agree to take them.  Missing doses can result in serious side effects.  I understand that  drinking alcohol, or other illicit drug use, may cause potential side effects.  I will not stop my medication abruptly without first discussing it with my provider.    Staying Connected With Others  I will stay in touch with my friends, family members, and my primary care provider/team.    Use your imagination  Be creative.  We all have a creative side; it doesn t matter if it s oil painting, sand castles, or mud pies! This will also kick up the endorphins.    Witness Beauty  (AKA stop and smell the roses) Take a look outside, even in mid-winter. Notice colors, textures. Watch the squirrels and birds.     Service to others  Be of service to others.  There is always someone else in need.  By helping others we can  get out of ourselves  and remember the really important things.  This also provides opportunities for practicing all the other parts of the program.    Humor  Laugh and be silly!  Adjust your TV habits for less news and crime-drama and more comedy.    Control your stress  Try breathing deep, massage therapy, biofeedback, and meditation. Find time to relax each day.     My support system    Clinic Contact:  Phone number:    Contact 1:  Phone number:    Contact 2:  Phone number:    Church/:  Phone number:    Therapist:  Phone number:    Local crisis center:    Phone number:    Other community support:  Phone number:

## 2018-12-04 NOTE — NURSING NOTE
Health Maintenance Due   Topic Date Due     TETANUS IMMUNIZATION (SYSTEM ASSIGNED)  07/07/1991     DEPRESSION ACTION PLAN  07/07/1991     HIV SCREEN (SYSTEM ASSIGNED)  07/07/1991     Cally BOWENS LPN

## 2018-12-04 NOTE — MR AVS SNAPSHOT
"              After Visit Summary   12/4/2018    Clint Alvarez    MRN: [id number]           Patient Information     Date Of Birth          1975        Visit Information        Provider Department      12/4/2018 3:40 PM Israel Matthews PA-C [facility]        Today's Diagnoses     Tooth infection    -  1      Care Instructions      Dental Abscess    An abscess is a pocket of pus at the tip of a tooth root in your jaw bone. It is caused by an infection at the root of the tooth. It can cause pain and swelling of the gum, cheek, or jaw. Pain may spread from the tooth to your ear or the area of your jaw on the same side. If the abscess isn t treated, it appears as a bubble or swelling on the gum near the tooth. The pressure that builds in this swelling is the source of the pain. More serious infections cause your face to swell.  An abscess can be caused by a crack in the tooth, a cavity, a gum infection, or a combination of these. Once the pulp of the tooth is exposed, bacteria can spread down the roots to the tip. If the bacteria are not stopped, they can damage the bone and soft tissue, and an abscess can form.  Home care  Follow these guidelines when caring for yourself at home:    Don't have hot and cold foods and drinks. Your tooth may be sensitive to changes in temperature. Don t chew on the side of the infected tooth.    If your tooth is chipped or cracked, or if there is a large open cavity, put oil of cloves directly on the tooth to relieve pain. You can buy oil of cloves at drugstores. Some pharmacies carry an over-the-counter \"toothache kit.\" This contains a paste that you can put on the exposed tooth to make it less sensitive.    Put a cold pack on your jaw over the sore area to help reduce pain.    You may use over-the-counter medicine to ease pain, unless another medicine was prescribed. If you have chronic liver or kidney disease, talk with your healthcare provider before using " acetaminophen or ibuprofen. Also talk with your provider if you ve had a stomach ulcer or GI bleeding.    An antibiotic will be prescribed. Take it until finished, even if you are feeling better after a few days.  Follow-up care  Follow up with your dentist or an oral surgeon, or as advised. Once an infection occurs in a tooth, it will continue to be a problem until the infection is drained. This is done through surgery or a root canal. Or you may need to have your tooth pulled.  Call 911  Call 911 if any of these occur:    Unusual drowsiness    Headache or stiff neck    Weakness or fainting    Difficulty swallowing, breathing, or opening your mouth    Swollen eyelids  When to seek medical advice  Call your healthcare provider right away if any of these occur:    Your face becomes more swollen or red    Pain gets worse or spreads to your neck    Fever of 100.4  F (38.0  C) or higher, or as directed by your healthcare provider    Pus drains from the tooth  Date Last Reviewed: 10/1/2016    1317-6204 [employer]. [address] [place] [address]. All rights reserved. This information is not intended as a substitute for professional medical care. Always follow your healthcare professional's instructions.      Orajel OTC topical pain relief medication for dental irritation.     May continue to use NSAIDs or tylenol as needed for pain.     Mouth rinse with saline rinse - neutralizes helps to reduce progression    Maintain good dental hygiene - we discussed finger brushing          Follow-ups after your visit        Who to contact     If you have questions or need follow up information about today's clinic visit or your schedule please contact [facility] directly at [phone].  Normal or non-critical lab and imaging results will be communicated to you by MyChart, letter or phone within 4 business days after the clinic has received the results. If you do not hear from us within 7 days,  please contact the clinic through MySQUAR or phone. If you have a critical or abnormal lab result, we will notify you by phone as soon as possible.  Submit refill requests through My[employer]AR or call your pharmacy and they will forward the refill request to us. Please allow 3 business days for your refill to be completed.          Additional Information About Your Visit        Care EveryWhere ID     This is your Care EveryWhere ID. This could be used by other organizations to access your [facility] medical records  [id number]        Your Vitals Were     Pulse Temperature Respirations BMI (Body Mass Index)          76 98  F (36.7  C) (Oral) 16 38.39 kg/m2         Blood Pressure from Last 3 Encounters:   12/04/18 128/70   10/26/18 132/80   10/11/18 130/80    Weight from Last 3 Encounters:   12/04/18 243 lb 4.8 oz (110.4 kg)   10/26/18 230 lb 11.2 oz (104.6 kg)   10/11/18 226 lb 14.4 oz (102.9 kg)              We Performed the Following     DEPRESSION ACTION PLAN (DAP)          Today's Medication Changes          These changes are accurate as of 12/4/18  4:02 PM.  If you have any questions, ask your nurse or doctor.               Start taking these medicines.        Dose/Directions    amoxicillin-clavulanate 875-125 MG tablet   Commonly known as:  AUGMENTIN   Used for:  Tooth infection   Started by:  Israel Matthews, ANGELI        Dose:  1 tablet   Take 1 tablet by mouth 2 times daily for 14 days   Quantity:  28 tablet   Refills:  0            Where to get your medicines      These medications were sent to [id number] - [place], [place] - [address] [place] [address]  [address] [place] [id number]    Hours:  M-F 8:30-6:30; Sat 9-4; closed Sunday Phone:  [phone]     amoxicillin-clavulanate 875-125 MG tablet                Primary Care Provider Office Phone # Fax #    [facility] [phone]       [address] [facility] [address]        Equal Access to Services     LORA ORTIZ AH: Eva parham  roman Hernandez, waantwanda luzairaadaha, qaybta kaluna blood, cindy tobar adeleflores sarahsamia layuriysusan mora. So [facility] [phone].    ATENCIÓN: Si habla myra, tiene a khalil disposición servicios gratuitos de asistencia lingüística. Analia al [phone].    We comply with applicable federal civil rights laws and [place] laws. We do not discriminate on the basis of race, color, national origin, age, disability, sex, sexual orientation, or gender identity.            Thank you!     Thank you for choosing [facility]  for your care. Our goal is always to provide you with excellent care. Hearing back from our patients is one way we can continue to improve our services. Please take a few minutes to complete the written survey that you may receive in the mail after your visit with us. Thank you!             Your Updated Medication List - Protect others around you: Learn how to safely use, store and throw away your medicines at www.disposemymeds.org.          This list is accurate as of 12/4/18  4:02 PM.  Always use your most recent med list.                   Brand Name Dispense Instructions for use Diagnosis    amoxicillin-clavulanate 875-125 MG tablet    AUGMENTIN    28 tablet    Take 1 tablet by mouth 2 times daily for 14 days    Tooth infection       dexamethasone 0.1 % ophthalmic suspension    DECADRON    5 mL    1 drop in each ear daily for 5 days    Other atopic dermatitis       hydrocortisone 1 % external cream    CORTAID    30 g    Apply sparingly to affected area twice daily for 14 days.    Atopic dermatitis, unspecified type

## 2018-12-04 NOTE — PROGRESS NOTES
SUBJECTIVE:   Clint Alvarez is a 43 year old male who presents to clinic today for the following health issues:      Concern - tooth problems  Onset: on and off for 10 years    Description:   Tooth problems    Intensity: moderate, severe    Progression of Symptoms:  worsening    Accompanying Signs & Symptoms:  nothing    Previous history of similar problem:   yes    Precipitating factors:   Worsened by: pressure on the tooth, hot or cold    Alleviating factors:  Improved by: nothing    Therapies Tried and outcome: Naproxen, aspirin, slight relief    Patient is a 43 year old male who presents with his significant other for evaluation of possible infected right lower molar tooth. Patient has several decayed/rotten teeth and is currently scheduled to be seen at the [facility] dental clinic on 01/07/2019 for extraction. Currently he says that the tooth has been painful and he notices an bad taste. Patient does not have good dental hygiene and says that he does not brush very often as the pain from contact is too much. We discussed alternative strategies such as use of finger to apply/scrub toothpaste over teeth/gums/tongue. Also recommend that patient use OTC topical pain relief gels for teeth as needed.     Problem list and histories reviewed & adjusted, as indicated.  Additional history: as documented      Reviewed and updated as needed this visit by clinical staff  Tobacco  Allergies  Meds  Med Hx  Surg Hx  Fam Hx  Soc Hx      Reviewed and updated as needed this visit by Provider         ROS:  CONSTITUTIONAL: NEGATIVE for fever, chills, change in weight  ENT/MOUTH: rotten teeth, pain in teeth  RESP: NEGATIVE for significant cough or SOB  CV: NEGATIVE for chest pain, palpitations or peripheral edema  ROS otherwise negative    OBJECTIVE:     /70 (BP Location: Left arm, Patient Position: Chair, Cuff Size: Adult Large)  Pulse 76  Temp 98  F (36.7  C) (Oral)  Resp 16  Wt 243 lb 4.8 oz (110.4 kg)  BMI 38.39  kg/m2  Body mass index is 38.39 kg/(m^2).  GENERAL: healthy, alert and no distress  HENT: ear canals and TM's normal, nose and mouth with several rotten/decayed teeth, right lower molar with white fluid in central tooth concerning for infection.   RESP: lungs clear to auscultation - no rales, rhonchi or wheezes  CV: regular rate and rhythm, normal S1 S2, no S3 or S4, no murmur, click or rub, no peripheral edema and peripheral pulses strong  NEURO: Normal strength and tone, mentation intact and speech normal  PSYCH: mentation appears normal, affect normal/bright    Diagnostic Test Results:  none     ASSESSMENT/PLAN:     1. Tooth infection  Start augmentin twice daily for next 2 weeks for suspected dental infection. Instructed patient to return to clinic if symptoms worsen. Patient also encouraged to begin brushing twice daily and use orajel as needed for pain.   - amoxicillin-clavulanate (AUGMENTIN) 875-125 MG tablet; Take 1 tablet by mouth 2 times daily for 14 days  Dispense: 28 tablet; Refill: 0    Follow up with clinic as needed or sooner if conditions change, worsen or fail to improve as expected.      Israel Matthews PA-C  [facility]

## 2018-12-19 ENCOUNTER — OFFICE VISIT (OUTPATIENT)
Dept: BEHAVIORAL HEALTH | Facility: CLINIC | Age: 43
End: 2018-12-19
Payer: COMMERCIAL

## 2018-12-19 DIAGNOSIS — F41.9 ANXIETY: ICD-10-CM

## 2018-12-19 DIAGNOSIS — F33.1 MODERATE RECURRENT MAJOR DEPRESSION (H): Primary | ICD-10-CM

## 2018-12-19 PROCEDURE — 90791 PSYCH DIAGNOSTIC EVALUATION: CPT | Performed by: MARRIAGE & FAMILY THERAPIST

## 2018-12-19 ASSESSMENT — PATIENT HEALTH QUESTIONNAIRE - PHQ9
5. POOR APPETITE OR OVEREATING: NEARLY EVERY DAY
SUM OF ALL RESPONSES TO PHQ QUESTIONS 1-9: 24

## 2018-12-19 ASSESSMENT — ANXIETY QUESTIONNAIRES
3. WORRYING TOO MUCH ABOUT DIFFERENT THINGS: NEARLY EVERY DAY
5. BEING SO RESTLESS THAT IT IS HARD TO SIT STILL: NEARLY EVERY DAY
1. FEELING NERVOUS, ANXIOUS, OR ON EDGE: NEARLY EVERY DAY
2. NOT BEING ABLE TO STOP OR CONTROL WORRYING: NEARLY EVERY DAY
GAD7 TOTAL SCORE: 19
IF YOU CHECKED OFF ANY PROBLEMS ON THIS QUESTIONNAIRE, HOW DIFFICULT HAVE THESE PROBLEMS MADE IT FOR YOU TO DO YOUR WORK, TAKE CARE OF THINGS AT HOME, OR GET ALONG WITH OTHER PEOPLE: EXTREMELY DIFFICULT
7. FEELING AFRAID AS IF SOMETHING AWFUL MIGHT HAPPEN: NEARLY EVERY DAY
6. BECOMING EASILY ANNOYED OR IRRITABLE: SEVERAL DAYS
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
## 2018-12-20 ASSESSMENT — ANXIETY QUESTIONNAIRES: GAD7 TOTAL SCORE: 19

## 2019-01-13 PROBLEM — F33.1 MODERATE RECURRENT MAJOR DEPRESSION (H): Status: ACTIVE | Noted: 2019-01-13

## 2019-01-13 PROBLEM — F41.9 ANXIETY: Status: ACTIVE | Noted: 2019-01-13

## 2019-02-28 ENCOUNTER — OFFICE VISIT (OUTPATIENT)
Dept: FAMILY MEDICINE | Facility: OTHER | Age: 44
End: 2019-02-28
Payer: COMMERCIAL

## 2019-02-28 VITALS
TEMPERATURE: 98.2 F | SYSTOLIC BLOOD PRESSURE: 148 MMHG | BODY MASS INDEX: 40.13 KG/M2 | HEIGHT: 67 IN | DIASTOLIC BLOOD PRESSURE: 98 MMHG | RESPIRATION RATE: 16 BRPM | WEIGHT: 255.7 LBS | HEART RATE: 80 BPM

## 2019-02-28 DIAGNOSIS — E66.01 MORBID OBESITY (H): ICD-10-CM

## 2019-02-28 DIAGNOSIS — I10 ESSENTIAL HYPERTENSION: Primary | ICD-10-CM

## 2019-02-28 DIAGNOSIS — R63.5 WEIGHT GAIN: ICD-10-CM

## 2019-02-28 LAB — TSH SERPL DL<=0.005 MIU/L-ACNC: 2.13 MU/L (ref 0.4–4)

## 2019-02-28 PROCEDURE — 36415 COLL VENOUS BLD VENIPUNCTURE: CPT | Performed by: PHYSICIAN ASSISTANT

## 2019-02-28 PROCEDURE — 99213 OFFICE O/P EST LOW 20 MIN: CPT | Performed by: PHYSICIAN ASSISTANT

## 2019-02-28 PROCEDURE — 84443 ASSAY THYROID STIM HORMONE: CPT | Performed by: PHYSICIAN ASSISTANT

## 2019-02-28 RX ORDER — LISINOPRIL AND HYDROCHLOROTHIAZIDE 12.5; 2 MG/1; MG/1
1 TABLET ORAL DAILY
Qty: 30 TABLET | Refills: 1 | Status: SHIPPED | OUTPATIENT
Start: 2019-02-28 | End: 2019-03-05 | Stop reason: SINTOL

## 2019-02-28 ASSESSMENT — PAIN SCALES - GENERAL: PAINLEVEL: NO PAIN (0)

## 2019-02-28 ASSESSMENT — MIFFLIN-ST. JEOR: SCORE: 1999.51

## 2019-02-28 NOTE — PROGRESS NOTES
"  SUBJECTIVE:   Clint Alvarez is a 43 year old male who presents to clinic today for the following health issues:      Concern - discuss weight  Onset: Since 9-2018    Description:   Weight gain    Intensity: mild    Progression of Symptoms:  same    Accompanying Signs & Symptoms:  nothing    Previous history of similar problem:   no    Precipitating factors:   Worsened by: nothing    Alleviating factors:  Improved by: nothing    Therapies Tried and outcome: exercise, watch his diet; no relief    Patient is a 43 year old male who presents to discuss weight loss and high blood pressure. BP is 148/98 today, per patients report he has obtained similar readings at the store. He has recently quit smoking cigarettes, but does not feel that his weight gain is due to increased stress/snacking. Reports small meals throughout the day. We will check labs today to rule out alternative causes.    Problem list and histories reviewed & adjusted, as indicated.  Additional history: as documented    Reviewed and updated as needed this visit by clinical staff  Tobacco  Allergies  Meds  Med Hx  Surg Hx  Fam Hx  Soc Hx      Reviewed and updated as needed this visit by Provider         ROS:  Constitutional, HEENT, cardiovascular, pulmonary, gi and gu systems are negative, except as otherwise noted.    OBJECTIVE:     BP (!) 148/98 (BP Location: Right arm, Patient Position: Chair, Cuff Size: Adult Large)   Pulse 80   Temp 98.2  F (36.8  C) (Oral)   Resp 16   Ht 1.695 m (5' 6.75\")   Wt 116 kg (255 lb 11.2 oz)   BMI 40.35 kg/m    Body mass index is 40.35 kg/m .  GENERAL: healthy, alert and no distress  NECK: no adenopathy, no asymmetry, masses, or scars and thyroid normal to palpation  RESP: lungs clear to auscultation - no rales, rhonchi or wheezes  CV: regular rate and rhythm, normal S1 S2, no S3 or S4, no murmur, click or rub, no peripheral edema and peripheral pulses strong  ABDOMEN: soft, nontender, no hepatosplenomegaly, " no masses and bowel sounds normal  MS: no gross musculoskeletal defects noted, no edema    Diagnostic Test Results:  Results for orders placed or performed in visit on 02/28/19   TSH with free T4 reflex   Result Value Ref Range    TSH 2.13 0.40 - 4.00 mU/L       ASSESSMENT/PLAN:     1. Essential hypertension  Follow up in 4 weeks to recheck BP.  - lisinopril-hydrochlorothiazide (PRINZIDE/ZESTORETIC) 20-12.5 MG tablet; Take 1 tablet by mouth daily  Dispense: 30 tablet; Refill: 1    2. Weight gain  Discussed nutritionist referral to discuss diet.  - TSH with free T4 reflex    3. Morbid obesity (H)  Patient encouraged to begin daily exercise of 30 minutes or more. Reassess weight in 4 weeks.      Follow up with clinic as needed 4 weeks if conditions change, worsen or fail to improve as expected.      Israel Matthews PA-C  [facility]

## 2019-02-28 NOTE — NURSING NOTE
Health Maintenance Due   Topic Date Due     HIV SCREEN (SYSTEM ASSIGNED)  07/07/1991     DTAP/TDAP/TD IMMUNIZATION (1 - Tdap) 07/07/1998     Cally BOWENS LPN

## 2019-02-28 NOTE — PATIENT INSTRUCTIONS
Patient Education     What is High Blood Pressure?  High blood pressure (also called hypertension) is known as the  silent killer.  This is because most of the time it doesn t cause symptoms. In fact, many people don t know they have it until other problems develop. In most cases, high blood pressure often requires lifelong treatment.  Understanding blood pressure  The circulatory system is made up of the heart and blood vessels that carry blood through the body. Your heart is the pump for this system. With each heartbeat (contraction), the heart sends blood out through large blood vessels called arteries. Blood pressure is a measure of how hard the moving blood pushes against the walls of the arteries.  High blood pressure can harm your health  In a healthy blood vessel, the blood moves smoothly through the vessel and puts normal pressure on the vessel walls.       High blood pressure occurs when blood pushes too hard against artery walls. This causes damage to the artery walls and then the formation of scar tissue as it heals. This makes the arteries stiff and weak. Plaque sticks to the scarred tissue narrowing and hardening the arteries. High blood pressure also causes your heart to work harder to get blood out to the body. High blood pressure raises your risk of heart attack, also known as acute myocardial infarction, or AMI, heart failure, and stroke. It can also lead to kidney disease, and blindness. In general, if you have high blood pressure, keeping your blood pressure below 130/80 mmHg may help prevent these problems. Your healthcare provider may prescribe medicine to help control blood pressure if lifestyle changes are not enough.  It's important to know your blood pressure numbers. Blood pressure measurements are given as 2 numbers. Systolic blood pressure is the upper number. This is the pressure when the heart contracts. Diastolic blood pressure is the lower number. This is the pressure when the  "heart relaxes between beats.  Blood pressure is categorized as normal, elevated, or stage 1 or stage 2 high blood pressure:    Normal blood pressure is systolic of less than 120 and diastolic of less than 80 (120/80)    Elevated blood pressure is systolic of 120 to 129 and diastolic less than 80    Stage 1 high blood pressure is systolic is 130 to 139 or diastolic between 80 to 89    Stage 2 high blood pressure is when systolic is 140 or higher or the diastolic is 90 or higher  High blood pressure is diagnosed when multiple, separate readings show blood pressure above 130/80 mmHg. Talk with your healthcare provider if you have questions or concerns about your blood pressure readings.  Measuring blood pressure  An example of a blood pressure measurement is 120/70 (120 over 70). The top number is the pressure of blood against the artery walls during a heartbeat (systolic). The bottom number is the pressure of blood against artery walls between heartbeats (diastolic). Talk with your healthcare provider to find out what your blood pressure goals should be.   Controlling blood pressure  If your blood pressure is too high, work with your doctor on a plan for lowering it. Below are steps you can take that will help lower your blood pressure.    Choose heart-healthy foods. Eating healthier meals helps you control your blood pressure. Ask your doctor about the DASH eating plan. This plan helps reduce blood pressure by limiting the amount of sodium (salt) you have in your diet. DASH also encourages eating plenty of fruits and vegetables, low-fat or non-fat dairy, whole-grains, and foods high in fiber, and low in fat. This also provides an enhanced amount of potassium which can also help lower blood pressure.    Reduce sodium. Reducing sodium in your diet reduces fluid retention. Fluid retention caused by too much salt increases blood volume and blood pressure. The American Heart Association s (AHA) \"ideal\" sodium intake " recommendation is 1,500 milligrams per day.  However, since American's eat so much salt, the AHA says a positive change can occur by cutting back to even 2,400 milligrams of sodium a day.    Maintain a healthy weight. Being overweight makes you more likely to have high blood pressure. Losing excess weight helps lower blood pressure.    Exercise regularly. Daily exercise helps your heart and blood vessels work better and stay healthier. It can help lower your blood pressure.    Stop smoking. Smoking increases blood pressure and damages blood vessels.    Limit alcohol. Drinking too much alcohol can raise blood pressure. Men should have no more than 2 drinks a day. Women should have no more than 1. (A drink is equal to 1 beer, or a small glass of wine, or a shot of liquor.)    Control stress. Stress makes your heart work harder and beat faster. Controlling stress helps you control your blood pressure.  Facts about high blood pressure    Feeling OK does not mean that blood pressure is under control. Likewise, feeling bad doesn t mean it s out of control. The only way to know for sure is to check your pressure regularly.    Medicine is only one part of controlling high blood pressure. You also need to manage your weight, get regular exercise, and adjust your eating habits.    High blood pressure is usually a lifelong problem. But it can be controlled with healthy lifestyle changes and medicine.    Hypertension is not the same as stress. Although stress may be a factor in high blood pressure, it s only one part of the story.    Blood pressure medicines need to be taken every day. Stopping suddenly may cause a dangerous increase in pressure.   Date Last Reviewed: 4/27/2016 2000-2018 [employer]. [address] [place] [address], [place] [address]. All rights reserved. This information is not intended as a substitute for professional medical care. Always follow your healthcare professional's instructions.            Patient Education     Controlling High Blood Pressure  High blood pressure (hypertension) is often called the silent killer. This is because many people who have it don t know it. High blood pressure can raise your risk of heart attack, stroke, and heart failure. Controlling your blood pressure can decrease your risk of these problems. Know your blood pressure and remember to check it regularly. Doing so can save your life.  Blood pressure measurements are given as 2 numbers. Systolic blood pressure is the upper number. This is the pressure when the heart contracts. Diastolic blood pressure is the lower number. This is the pressure when the heart relaxes between beats.  Blood pressure is categorized as normal, elevated, or stage 1 or stage 2 high blood pressure:    Normal blood pressure is systolic of less than 120 and diastolic of less than 80 (120/80)    Elevated blood pressure is systolic of 120 to 129 and diastolic less than 80    Stage 1 high blood pressure is systolic is 130 to 139 or diastolic between 80 to 89    Stage 2 high blood pressure is when systolic is 140 or higher or the diastolic is 90 or higher  Here are some things you can do to help control your blood pressure.    Choose heart-healthy foods    Select low-salt, low-fat foods. Limit sodium intake to 2,400 mg per day or the amount suggested by your healthcare provider.    Limit canned, dried, cured, packaged, and fast foods. These can contain a lot of salt.    Eat 8 to 10 servings of fruits and vegetables every day.    Choose lean meats, fish, or chicken.    Eat whole-grain pasta, brown rice, and beans.    Eat 2 to 3 servings of low-fat or fat-free dairy products.    Ask your doctor about the DASH eating plan. This plan helps reduce blood pressure.    When you go to a restaurant, ask that your meal be prepared with no added salt.  Maintain a healthy weight    Ask your healthcare provider how many calories to eat a day. Then stick to that  number.    Ask your healthcare provider what weight range is healthiest for you. If you are overweight, a weight loss of only 3% to 5% of your body weight can help lower blood pressure. Generally, a good weight loss goal is to lose 10% of your body weight in a year.    Limit snacks and sweets.    Get regular exercise.  Get up and get active    Choose activities you enjoy. Find ones you can do with friends or family. This includes bicycling, dancing, walking, and jogging.    Park farther away from building entrances.    Use stairs instead of the elevator.    When you can, walk or bike instead of driving.    Firth leaves, garden, or do household repairs.    Be active at a moderate to vigorous level of physical activity for at least 40 minutes for a minimum of 3 to 4 days a week.   Manage stress    Make time to relax and enjoy life. Find time to laugh.    Communicate your concerns with your loved ones and your healthcare provider.    Visit with family and friends, and keep up with hobbies.  Limit alcohol and quit smoking    Men should have no more than 2 drinks per day.    Women should have no more than 1 drink per day.    Talk with your healthcare provider about quitting smoking. Smoking significantly increases your risk for heart disease and stroke. Ask your healthcare provider about community smoking cessation programs and other options.  Medicines  If lifestyle changes aren t enough, your healthcare provider may prescribe high blood pressure medicine. Take all medicines as prescribed. If you have any questions about your medicines, ask your healthcare provider before stopping or changing them.   Date Last Reviewed: 4/27/2016 2000-2018 [email or web address]. [address], [place] [address]. All rights reserved. This information is not intended as a substitute for professional medical care. Always follow your healthcare professional's instructions.

## 2019-03-04 ENCOUNTER — TELEPHONE (OUTPATIENT)
Dept: FAMILY MEDICINE | Facility: OTHER | Age: 44
End: 2019-03-04

## 2019-03-04 DIAGNOSIS — I10 BENIGN ESSENTIAL HYPERTENSION: Primary | ICD-10-CM

## 2019-03-04 NOTE — TELEPHONE ENCOUNTER
Reason for Call:  Other call back    Detailed comments: Pt walked in stating he was to let Satya know if he developed a cough after being on the new blood pressure med. He has developed a cough. Pt stated he stopped taking the med this morning. Please call him and advise.     Phone Number Patient can be reached at: Home number on file [phone] (home)    Best Time: anytime    Can we leave a detailed message on this number? YES    Call taken on 3/4/2019 at 11:40 AM by Dina Donnelly

## 2019-03-05 ENCOUNTER — MYC MEDICAL ADVICE (OUTPATIENT)
Dept: FAMILY MEDICINE | Facility: OTHER | Age: 44
End: 2019-03-05

## 2019-03-05 DIAGNOSIS — E66.01 MORBID OBESITY (H): Primary | ICD-10-CM

## 2019-03-05 RX ORDER — LOSARTAN POTASSIUM AND HYDROCHLOROTHIAZIDE 12.5; 5 MG/1; MG/1
1 TABLET ORAL DAILY
Qty: 30 TABLET | Refills: 1 | Status: SHIPPED | OUTPATIENT
Start: 2019-03-05 | End: 2019-08-25

## 2019-03-05 NOTE — TELEPHONE ENCOUNTER
Called and left message for patient.    Myra Valentin XRO/   no dyspnea on exertion/no palpitations/no chest pain

## 2019-03-05 NOTE — TELEPHONE ENCOUNTER
Please call patient to let him know that I will send out an alternative medication to the pharmacy.     Israel Matthews PA-C on 3/5/2019 at 8:13 AM

## 2019-03-07 NOTE — TELEPHONE ENCOUNTER
I called this patient with the following per Israel Matthews PA-C: have placed the referral for nutritionist, please help the patient schedule the appointment.     I informed him that he will be receiving a call to schedule the test.

## 2019-03-07 NOTE — TELEPHONE ENCOUNTER
I have placed the referral for nutritionist, please help the patient schedule the appointment.     Israel Matthews PA-C on 3/7/2019 at 7:23 AM

## 2019-03-22 ENCOUNTER — APPOINTMENT (OUTPATIENT)
Dept: CT IMAGING | Facility: CLINIC | Age: 44
End: 2019-03-22
Attending: PHYSICIAN ASSISTANT
Payer: COMMERCIAL

## 2019-03-22 ENCOUNTER — HOSPITAL ENCOUNTER (EMERGENCY)
Facility: CLINIC | Age: 44
Discharge: HOME | End: 2019-03-22
Attending: PHYSICIAN ASSISTANT | Admitting: PHYSICIAN ASSISTANT
Payer: COMMERCIAL

## 2019-03-22 VITALS
TEMPERATURE: 97.4 F | SYSTOLIC BLOOD PRESSURE: 138 MMHG | OXYGEN SATURATION: 96 % | BODY MASS INDEX: 39.45 KG/M2 | HEART RATE: 69 BPM | DIASTOLIC BLOOD PRESSURE: 94 MMHG | RESPIRATION RATE: 18 BRPM | WEIGHT: 250 LBS

## 2019-03-22 DIAGNOSIS — N20.1 URETERAL STONE: ICD-10-CM

## 2019-03-22 LAB
ALBUMIN SERPL-MCNC: 4.1 G/DL (ref 3.4–5)
ALBUMIN UR-MCNC: 30 MG/DL
ALP SERPL-CCNC: 111 U/L (ref 40–150)
ALT SERPL W P-5'-P-CCNC: 71 U/L (ref 0–70)
ANION GAP SERPL CALCULATED.3IONS-SCNC: 10 MMOL/L (ref 3–14)
APPEARANCE UR: ABNORMAL
AST SERPL W P-5'-P-CCNC: 22 U/L (ref 0–45)
BASOPHILS # BLD AUTO: 0.1 10E9/L (ref 0–0.2)
BASOPHILS NFR BLD AUTO: 1 %
BILIRUB SERPL-MCNC: 0.6 MG/DL (ref 0.2–1.3)
BILIRUB UR QL STRIP: NEGATIVE
BUN SERPL-MCNC: 19 MG/DL (ref 7–30)
CALCIUM SERPL-MCNC: 8.8 MG/DL (ref 8.5–10.1)
CHLORIDE SERPL-SCNC: 106 MMOL/L (ref 94–109)
CO2 SERPL-SCNC: 23 MMOL/L (ref 20–32)
COLOR UR AUTO: YELLOW
CREAT SERPL-MCNC: 1.24 MG/DL (ref 0.66–1.25)
DIFFERENTIAL METHOD BLD: ABNORMAL
EOSINOPHIL NFR BLD AUTO: 0.5 %
ERYTHROCYTE [DISTWIDTH] IN BLOOD BY AUTOMATED COUNT: 12.8 % (ref 10–15)
GFR SERPL CREATININE-BSD FRML MDRD: 69 ML/MIN/{1.73_M2}
GLUCOSE SERPL-MCNC: 138 MG/DL (ref 70–99)
GLUCOSE UR STRIP-MCNC: NEGATIVE MG/DL
HCT VFR BLD AUTO: 46.8 % (ref 40–53)
HGB BLD-MCNC: 16 G/DL (ref 13.3–17.7)
HGB UR QL STRIP: ABNORMAL
IMM GRANULOCYTES # BLD: 0.1 10E9/L (ref 0–0.4)
IMM GRANULOCYTES NFR BLD: 0.5 %
KETONES UR STRIP-MCNC: NEGATIVE MG/DL
LEUKOCYTE ESTERASE UR QL STRIP: NEGATIVE
LIPASE SERPL-CCNC: 201 U/L (ref 73–393)
LYMPHOCYTES # BLD AUTO: 2.2 10E9/L (ref 0.8–5.3)
LYMPHOCYTES NFR BLD AUTO: 18.6 %
MCH RBC QN AUTO: 28.2 PG (ref 26.5–33)
MCHC RBC AUTO-ENTMCNC: 34.2 G/DL (ref 31.5–36.5)
MCV RBC AUTO: 83 FL (ref 78–100)
MONOCYTES # BLD AUTO: 0.5 10E9/L (ref 0–1.3)
MONOCYTES NFR BLD AUTO: 4.5 %
MUCOUS THREADS #/AREA URNS LPF: PRESENT /LPF
NEUTROPHILS # BLD AUTO: 8.8 10E9/L (ref 1.6–8.3)
NEUTROPHILS NFR BLD AUTO: 74.9 %
NITRATE UR QL: NEGATIVE
NRBC # BLD AUTO: 0 10*3/UL
NRBC BLD AUTO-RTO: 0 /100
PH UR STRIP: 5 PH (ref 5–7)
PLATELET # BLD AUTO: 305 10E9/L (ref 150–450)
POTASSIUM SERPL-SCNC: 4.2 MMOL/L (ref 3.4–5.3)
PROT SERPL-MCNC: 8.2 G/DL (ref 6.8–8.8)
RBC # BLD AUTO: 5.67 10E12/L (ref 4.4–5.9)
RBC #/AREA URNS AUTO: >182 /HPF (ref 0–2)
SODIUM SERPL-SCNC: 139 MMOL/L (ref 133–144)
SOURCE: ABNORMAL
SP GR UR STRIP: 1.03 (ref 1–1.03)
SQUAMOUS #/AREA URNS AUTO: <1 /HPF (ref 0–1)
UROBILINOGEN UR STRIP-MCNC: 0 MG/DL (ref 0–2)
WBC # BLD AUTO: 11.7 10E9/L (ref 4–11)
WBC #/AREA URNS AUTO: 3 /HPF (ref 0–5)
YEAST #/AREA URNS HPF: ABNORMAL /HPF

## 2019-03-22 PROCEDURE — 74176 CT ABD & PELVIS W/O CONTRAST: CPT

## 2019-03-22 PROCEDURE — 96361 HYDRATE IV INFUSION ADD-ON: CPT | Performed by: PHYSICIAN ASSISTANT

## 2019-03-22 PROCEDURE — 96374 THER/PROPH/DIAG INJ IV PUSH: CPT | Performed by: PHYSICIAN ASSISTANT

## 2019-03-22 PROCEDURE — 81001 URINALYSIS AUTO W/SCOPE: CPT | Performed by: FAMILY MEDICINE

## 2019-03-22 PROCEDURE — 99285 EMERGENCY DEPT VISIT HI MDM: CPT | Mod: 25 | Performed by: PHYSICIAN ASSISTANT

## 2019-03-22 PROCEDURE — 99285 EMERGENCY DEPT VISIT HI MDM: CPT | Mod: Z6 | Performed by: PHYSICIAN ASSISTANT

## 2019-03-22 PROCEDURE — 83690 ASSAY OF LIPASE: CPT | Performed by: PHYSICIAN ASSISTANT

## 2019-03-22 PROCEDURE — 96375 TX/PRO/DX INJ NEW DRUG ADDON: CPT | Performed by: PHYSICIAN ASSISTANT

## 2019-03-22 PROCEDURE — 80053 COMPREHEN METABOLIC PANEL: CPT | Performed by: PHYSICIAN ASSISTANT

## 2019-03-22 PROCEDURE — 85025 COMPLETE CBC W/AUTO DIFF WBC: CPT | Performed by: PHYSICIAN ASSISTANT

## 2019-03-22 PROCEDURE — 25000128 H RX IP 250 OP 636: Performed by: PHYSICIAN ASSISTANT

## 2019-03-22 RX ORDER — SODIUM CHLORIDE 9 MG/ML
1000 INJECTION, SOLUTION INTRAVENOUS CONTINUOUS
Status: DISCONTINUED | OUTPATIENT
Start: 2019-03-22 | End: 2019-03-22 | Stop reason: HOSPADM

## 2019-03-22 RX ORDER — HYDROMORPHONE HYDROCHLORIDE 1 MG/ML
0.5 INJECTION, SOLUTION INTRAMUSCULAR; INTRAVENOUS; SUBCUTANEOUS
Status: DISCONTINUED | OUTPATIENT
Start: 2019-03-22 | End: 2019-03-22 | Stop reason: HOSPADM

## 2019-03-22 RX ORDER — ONDANSETRON 2 MG/ML
4 INJECTION INTRAMUSCULAR; INTRAVENOUS EVERY 30 MIN PRN
Status: DISCONTINUED | OUTPATIENT
Start: 2019-03-22 | End: 2019-03-22 | Stop reason: HOSPADM

## 2019-03-22 RX ORDER — OXYCODONE AND ACETAMINOPHEN 5; 325 MG/1; MG/1
1-2 TABLET ORAL EVERY 4 HOURS PRN
Qty: 8 TABLET | Refills: 0 | Status: SHIPPED | OUTPATIENT
Start: 2019-03-22 | End: 2019-08-20

## 2019-03-22 RX ORDER — KETOROLAC TROMETHAMINE 30 MG/ML
30 INJECTION, SOLUTION INTRAMUSCULAR; INTRAVENOUS ONCE
Status: COMPLETED | OUTPATIENT
Start: 2019-03-22 | End: 2019-03-22

## 2019-03-22 RX ADMIN — KETOROLAC TROMETHAMINE 30 MG: 30 INJECTION, SOLUTION INTRAMUSCULAR at 16:25

## 2019-03-22 RX ADMIN — SODIUM CHLORIDE 1000 ML: 9 INJECTION, SOLUTION INTRAVENOUS at 16:25

## 2019-03-22 RX ADMIN — ONDANSETRON 4 MG: 2 INJECTION INTRAMUSCULAR; INTRAVENOUS at 16:31

## 2019-03-22 RX ADMIN — SODIUM CHLORIDE 1000 ML: 9 INJECTION, SOLUTION INTRAVENOUS at 17:34

## 2019-03-22 RX ADMIN — Medication 0.5 MG: at 16:32
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
## 2019-03-22 NOTE — ED TRIAGE NOTES
Pt has had right flank pain that radiates into his groin x last wk but worse today.  Hx of kidney stones w/last episode years ago.

## 2019-03-22 NOTE — DISCHARGE INSTRUCTIONS
It was a pleasure working with you today!  I hope your condition improves rapidly!     Please continue to push clear fluids.  It is okay to take Ibuprofen 800 mg every 8 hours as needed for pain.  Your first dose of this can be at 11 PM tonight, as we gave you a medication similar to it in the ED.    If the Ibuprofen is not working, then you can use the Percocet.  Please do not drive for 8 hours after taking this as it can impair your judgement.

## 2019-03-22 NOTE — ED AVS SNAPSHOT
[facility] Emergency Department  [address] [place] [address]  Phone:  [phone]  Fax:  [phone]                                    Clint Alvarez   MRN: [id number]    Department:  [facility] Emergency Department   Date of Visit:  3/22/2019           After Visit Summary Signature Page    I have received my discharge instructions, and my questions have been answered. I have discussed any challenges I see with this plan with the nurse or doctor.    ..........................................................................................................................................  Patient/Patient Representative Signature      ..........................................................................................................................................  Patient Representative Print Name and Relationship to Patient    ..................................................               ................................................  Date                                   Time    ..........................................................................................................................................  Reviewed by Signature/Title    ...................................................              ..............................................  Date                                               Time          22EPIC Rev 08/18

## 2019-03-24 ENCOUNTER — HOSPITAL ENCOUNTER (EMERGENCY)
Facility: CLINIC | Age: 44
Discharge: HOME | End: 2019-03-24
Attending: FAMILY MEDICINE | Admitting: FAMILY MEDICINE
Payer: COMMERCIAL

## 2019-03-24 VITALS
RESPIRATION RATE: 20 BRPM | WEIGHT: 250 LBS | TEMPERATURE: 98.6 F | DIASTOLIC BLOOD PRESSURE: 100 MMHG | HEART RATE: 64 BPM | OXYGEN SATURATION: 96 % | SYSTOLIC BLOOD PRESSURE: 147 MMHG | BODY MASS INDEX: 39.45 KG/M2

## 2019-03-24 DIAGNOSIS — N20.1 URETEROLITHIASIS: ICD-10-CM

## 2019-03-24 DIAGNOSIS — N23 RENAL COLIC: ICD-10-CM

## 2019-03-24 PROCEDURE — 99284 EMERGENCY DEPT VISIT MOD MDM: CPT | Mod: Z6 | Performed by: FAMILY MEDICINE

## 2019-03-24 PROCEDURE — 96376 TX/PRO/DX INJ SAME DRUG ADON: CPT | Performed by: FAMILY MEDICINE

## 2019-03-24 PROCEDURE — 25000132 ZZH RX MED GY IP 250 OP 250 PS 637: Performed by: FAMILY MEDICINE

## 2019-03-24 PROCEDURE — 96374 THER/PROPH/DIAG INJ IV PUSH: CPT | Performed by: FAMILY MEDICINE

## 2019-03-24 PROCEDURE — 25000128 H RX IP 250 OP 636: Performed by: FAMILY MEDICINE

## 2019-03-24 PROCEDURE — 96375 TX/PRO/DX INJ NEW DRUG ADDON: CPT | Performed by: FAMILY MEDICINE

## 2019-03-24 PROCEDURE — 99285 EMERGENCY DEPT VISIT HI MDM: CPT | Performed by: FAMILY MEDICINE

## 2019-03-24 RX ORDER — TAMSULOSIN HYDROCHLORIDE 0.4 MG/1
0.4 CAPSULE ORAL DAILY
Qty: 7 CAPSULE | Refills: 0 | Status: SHIPPED | OUTPATIENT
Start: 2019-03-24 | End: 2019-04-01

## 2019-03-24 RX ORDER — KETOROLAC TROMETHAMINE 30 MG/ML
30 INJECTION, SOLUTION INTRAMUSCULAR; INTRAVENOUS ONCE
Status: COMPLETED | OUTPATIENT
Start: 2019-03-24 | End: 2019-03-24

## 2019-03-24 RX ORDER — ONDANSETRON 2 MG/ML
4 INJECTION INTRAMUSCULAR; INTRAVENOUS EVERY 30 MIN PRN
Status: DISCONTINUED | OUTPATIENT
Start: 2019-03-24 | End: 2019-03-24 | Stop reason: HOSPADM

## 2019-03-24 RX ORDER — MELOXICAM 15 MG/1
15 TABLET ORAL DAILY
Qty: 7 TABLET | Refills: 0 | Status: SHIPPED | OUTPATIENT
Start: 2019-03-24 | End: 2019-08-20

## 2019-03-24 RX ORDER — HYDROMORPHONE HYDROCHLORIDE 1 MG/ML
0.5 INJECTION, SOLUTION INTRAMUSCULAR; INTRAVENOUS; SUBCUTANEOUS
Status: DISCONTINUED | OUTPATIENT
Start: 2019-03-24 | End: 2019-03-24 | Stop reason: HOSPADM

## 2019-03-24 RX ORDER — OXYCODONE HYDROCHLORIDE 5 MG/1
10 TABLET ORAL ONCE
Status: COMPLETED | OUTPATIENT
Start: 2019-03-24 | End: 2019-03-24

## 2019-03-24 RX ADMIN — Medication 0.5 MG: at 02:51

## 2019-03-24 RX ADMIN — ONDANSETRON 4 MG: 2 INJECTION INTRAMUSCULAR; INTRAVENOUS at 02:48

## 2019-03-24 RX ADMIN — Medication 0.5 MG: at 03:27

## 2019-03-24 RX ADMIN — OXYCODONE HYDROCHLORIDE 10 MG: 5 TABLET ORAL at 03:39

## 2019-03-24 RX ADMIN — KETOROLAC TROMETHAMINE 30 MG: 30 INJECTION, SOLUTION INTRAMUSCULAR at 02:50

## 2019-03-24 ASSESSMENT — ENCOUNTER SYMPTOMS
FEVER: 0
FLANK PAIN: 1
NAUSEA: 1

## 2019-03-24 NOTE — ED TRIAGE NOTES
Patient states he was seen 2 days ago, diagnosed with kidney stones, back tonight with the same R flank pain.

## 2019-03-24 NOTE — ED AVS SNAPSHOT
[facility] Emergency Department  [address] [place] [address]  Phone:  [phone]  Fax:  [phone]                                    Clint Alvarez   MRN: [id number]    Department:  [facility] Emergency Department   Date of Visit:  3/24/2019           After Visit Summary Signature Page    I have received my discharge instructions, and my questions have been answered. I have discussed any challenges I see with this plan with the nurse or doctor.    ..........................................................................................................................................  Patient/Patient Representative Signature      ..........................................................................................................................................  Patient Representative Print Name and Relationship to Patient    ..................................................               ................................................  Date                                   Time    ..........................................................................................................................................  Reviewed by Signature/Title    ...................................................              ..............................................  Date                                               Time          22EPIC Rev 08/18

## 2019-03-24 NOTE — ED PROVIDER NOTES
History     Chief Complaint   Patient presents with     Flank Pain     HPI  Clint Alvarez is a 43 year old male who presents to the emergency room secondary complaints of right-sided flank pain.  Patient states he was diagnosed with kidney stones 2 days ago in this emergency room.  He states he was doing okay until about an hour ago when the pain suddenly returned.  He states that he has had no fever but when the pain gets severe he gets some cold sweats.  He states the pain is very similar to the pain he experienced 2 days ago.  He denies significant blood in the urine.  He denies any other reason for the pain such as a fall or skin rash.  I reviewed the CT scan as well as the blood test and urine test results from his previous visit 2 days ago.  The CT scan report is copied below:    CT ABDOMEN AND PELVIS WITHOUT CONTRAST 3/22/2019 5:58 PM      HISTORY: Flank pain, stone disease suspected. Right flank pain,  hematuria.     Radiation dose for this scan was reduced using automated exposure  control, adjustment of the mA and/or kV according to patient size, or  iterative reconstruction technique.     FINDINGS: Severe hepatic fatty infiltration is noted. Cholecystectomy  surgical clips are present. 0.3 cm obstructing stone is noted within  the right distal ureter at the level of the sciatic notch with  associated mild to moderate right hydronephrosis. Mild to moderate  left hydronephrosis versus peripelvic cysts are also noted. No left  ureteral dilatation or ureteral stone is demonstrated. Normal  appendix. No pericolonic inflammatory stranding. Pelvic contents are  otherwise unremarkable for the unenhanced technique.                                                                      IMPRESSION:  1. Right ureteral distal 0.3 cm obstructing stone at the level of the  sciatic notch with associated mild right hydronephrosis.  2. No evidence of left urinary tract stone. Prominence of the left  renal hilum may  represent peripelvic stones. However, ureteropelvic  obstruction might have a similar appearance.  3. Severe hepatic fatty infiltration--possible etiologies include  consumption of alcohol or excessive carbohydrate intake, especially  sugar/fructose.  Metabolic syndrome commonly occurs in combination  with nonalcoholic fatty liver disease. Although often reversible,  nonalcoholic fatty liver disease can progress to steatohepatitis and  cirrhosis.     KAVON SILVESTRE MD    Allergies:  Allergies   Allergen Reactions     No Known Drug Allergy        Problem List:    Patient Active Problem List    Diagnosis Date Noted     Morbid obesity (H) 2019     Priority: Medium     Moderate recurrent major depression (H), with mixed features 2019     Priority: Medium     Anxiety, unspecified 2019     Priority: Medium     Dysthymia 10/26/2018     Priority: Medium        Past Medical History:    Past Medical History:   Diagnosis Date     DDD (degenerative disc disease)      Gallstone 2012     Kidney stone      Lyme disease        Past Surgical History:    Past Surgical History:   Procedure Laterality Date     INSERT CHEST TUBE      1994       Family History:    History reviewed. No pertinent family history.    Social History:  Marital Status:   [4]  Social History     Tobacco Use     Smoking status: Former Smoker     Packs/day: 0.50     Years: 20.00     Pack years: 10.00     Last attempt to quit: 2018     Years since quittin.5     Smokeless tobacco: Never Used   Substance Use Topics     Alcohol use: No     Drug use: No        Medications:      meloxicam (MOBIC) 15 MG tablet   tamsulosin (FLOMAX) 0.4 MG capsule   dexamethasone (DECADRON) 0.1 % ophthalmic susp   hydrocortisone (CORTAID) 1 % cream   losartan-hydrochlorothiazide (HYZAAR) 50-12.5 MG tablet   oxyCODONE-acetaminophen (PERCOCET) 5-325 MG tablet         Review of Systems   Constitutional: Negative for fever.   Gastrointestinal: Positive  for nausea.   Genitourinary: Positive for flank pain (right).   All other systems reviewed and are negative.      Physical Exam   BP: (!) 157/100  Pulse: 57  Heart Rate: 57  Temp: 98.6  F (37  C)  Resp: 18  Weight: 113.4 kg (250 lb)  SpO2: 96 %      Physical Exam   Constitutional: He is oriented to person, place, and time. He appears well-developed and well-nourished. He appears distressed.   Abdominal: Soft. Normal appearance. Bowel sounds are decreased. There is CVA tenderness (right).   Neurological: He is alert and oriented to person, place, and time.   Skin: Skin is warm. Capillary refill takes less than 2 seconds. He is diaphoretic.   Psychiatric: He has a normal mood and affect. His behavior is normal. Judgment and thought content normal.   Nursing note and vitals reviewed.      ED Course     ED Course as of Mar 24 2050   Sun Mar 24, 2019   0312 Patient reexamined and states that his pain is actually feeling quite a bit better.  Still in the right flank area but definitely improved.  We will continue to monitor.      Procedures               Critical Care time:  none               Medications   ketorolac (TORADOL) injection 30 mg (30 mg Intravenous Given 3/24/19 0250)   oxyCODONE (ROXICODONE) tablet 10 mg (10 mg Oral Given 3/24/19 0339)       Assessments & Plan (with Medical Decision Making)  43-year-old male to the ER secondary concerns of recurrence of his right flank pain symptoms.  He had been seen in this emergency room 2 days prior with diagnosis of a 3 mm right distal ureteral stone.  Patient did not fill the Percocet prescription he was given as he wants to avoid the use of narcotic medications.  He has been using some over-the-counter medications for his pain control but the pain was too much to bear this morning.  Patient with exam findings consistent with renal colic.  He was afebrile and did not appear to be septic.  Patient treated with IV Toradol and oral oxycodone with good resolution of his  symptoms.  Requested other options for pain medication to help with his renal colic symptoms other than narcotic medications.  He was initiated on medication as listed below.  To avoid use of ibuprofen if taking the meloxicam.  Plans follow-up in his clinic or return to the ER should he have increase or worsening symptoms.     I have reviewed the nursing notes.    I have reviewed the findings, diagnosis, plan and need for follow up with the patient.          Medication List      Started    meloxicam 15 MG tablet  Commonly known as:  MOBIC  15 mg, Oral, DAILY, TAKE WITH FOOD AS NEEDED FOR PAIN. WEAN OFF OF THE MEDICATIONS AS YOUR SYMPTOMS IMPROVE.     tamsulosin 0.4 MG capsule  Commonly known as:  FLOMAX  0.4 mg, Oral, DAILY                 I verbally discussed the findings of the evaluation today in the ER. I have verbally discussed with Clint the suggested treatment(s) as described in the discharge instructions and handouts. I have prescribed the above listed medications and instructed him on appropriate use of these medications.      I have verbally suggested he follow-up in his clinic or return to the ER for increased symptoms. See the follow-up recommendations documented  in the after visit summary in this visit's EPIC chart.      Final diagnoses:   Renal colic   Ureterolithiasis - 3mm right       3/24/2019   [facility] EMERGENCY DEPARTMENT     Bimal Thrasher DO  03/24/19 3671

## 2019-04-01 ENCOUNTER — APPOINTMENT (OUTPATIENT)
Dept: CT IMAGING | Facility: CLINIC | Age: 44
End: 2019-04-01
Attending: EMERGENCY MEDICINE
Payer: COMMERCIAL

## 2019-04-01 ENCOUNTER — HOSPITAL ENCOUNTER (EMERGENCY)
Facility: CLINIC | Age: 44
Discharge: HOME | End: 2019-04-01
Attending: EMERGENCY MEDICINE | Admitting: EMERGENCY MEDICINE
Payer: COMMERCIAL

## 2019-04-01 ENCOUNTER — NURSE TRIAGE (OUTPATIENT)
Dept: NURSING | Facility: CLINIC | Age: 44
End: 2019-04-01

## 2019-04-01 VITALS
DIASTOLIC BLOOD PRESSURE: 68 MMHG | RESPIRATION RATE: 22 BRPM | SYSTOLIC BLOOD PRESSURE: 142 MMHG | BODY MASS INDEX: 39.45 KG/M2 | WEIGHT: 250 LBS | TEMPERATURE: 97.7 F | OXYGEN SATURATION: 99 % | HEART RATE: 78 BPM

## 2019-04-01 DIAGNOSIS — N20.1 URETEROLITHIASIS: ICD-10-CM

## 2019-04-01 LAB
ALBUMIN UR-MCNC: NEGATIVE MG/DL
ANION GAP SERPL CALCULATED.3IONS-SCNC: 11 MMOL/L (ref 3–14)
APPEARANCE UR: CLEAR
BASOPHILS # BLD AUTO: 0.1 10E9/L (ref 0–0.2)
BASOPHILS NFR BLD AUTO: 0.8 %
BILIRUB UR QL STRIP: NEGATIVE
BUN SERPL-MCNC: 16 MG/DL (ref 7–30)
CALCIUM SERPL-MCNC: 8.8 MG/DL (ref 8.5–10.1)
CHLORIDE SERPL-SCNC: 111 MMOL/L (ref 94–109)
CO2 SERPL-SCNC: 20 MMOL/L (ref 20–32)
COLOR UR AUTO: YELLOW
CREAT SERPL-MCNC: 1.2 MG/DL (ref 0.66–1.25)
DIFFERENTIAL METHOD BLD: ABNORMAL
EOSINOPHIL NFR BLD AUTO: 0.8 %
ERYTHROCYTE [DISTWIDTH] IN BLOOD BY AUTOMATED COUNT: 12.7 % (ref 10–15)
GFR SERPL CREATININE-BSD FRML MDRD: 72 ML/MIN/{1.73_M2}
GLUCOSE SERPL-MCNC: 133 MG/DL (ref 70–99)
GLUCOSE UR STRIP-MCNC: NEGATIVE MG/DL
HCT VFR BLD AUTO: 47.2 % (ref 40–53)
HGB BLD-MCNC: 16 G/DL (ref 13.3–17.7)
HGB UR QL STRIP: ABNORMAL
HYALINE CASTS #/AREA URNS LPF: 1 /LPF (ref 0–2)
IMM GRANULOCYTES # BLD: 0.1 10E9/L (ref 0–0.4)
IMM GRANULOCYTES NFR BLD: 0.4 %
KETONES UR STRIP-MCNC: NEGATIVE MG/DL
LEUKOCYTE ESTERASE UR QL STRIP: NEGATIVE
LYMPHOCYTES # BLD AUTO: 1.9 10E9/L (ref 0.8–5.3)
LYMPHOCYTES NFR BLD AUTO: 16 %
MCH RBC QN AUTO: 28.5 PG (ref 26.5–33)
MCHC RBC AUTO-ENTMCNC: 33.9 G/DL (ref 31.5–36.5)
MCV RBC AUTO: 84 FL (ref 78–100)
MONOCYTES # BLD AUTO: 0.8 10E9/L (ref 0–1.3)
MONOCYTES NFR BLD AUTO: 6.2 %
MUCOUS THREADS #/AREA URNS LPF: PRESENT /LPF
NEUTROPHILS # BLD AUTO: 9.2 10E9/L (ref 1.6–8.3)
NEUTROPHILS NFR BLD AUTO: 75.8 %
NITRATE UR QL: NEGATIVE
NRBC # BLD AUTO: 0 10*3/UL
NRBC BLD AUTO-RTO: 0 /100
PH UR STRIP: 5 PH (ref 5–7)
PLATELET # BLD AUTO: 337 10E9/L (ref 150–450)
POTASSIUM SERPL-SCNC: 4.5 MMOL/L (ref 3.4–5.3)
RBC # BLD AUTO: 5.62 10E12/L (ref 4.4–5.9)
RBC #/AREA URNS AUTO: 4 /HPF (ref 0–2)
SODIUM SERPL-SCNC: 142 MMOL/L (ref 133–144)
SOURCE: ABNORMAL
SP GR UR STRIP: 1.02 (ref 1–1.03)
UROBILINOGEN UR STRIP-MCNC: 0 MG/DL (ref 0–2)
WBC # BLD AUTO: 12.1 10E9/L (ref 4–11)
WBC #/AREA URNS AUTO: 1 /HPF (ref 0–5)

## 2019-04-01 PROCEDURE — 36415 COLL VENOUS BLD VENIPUNCTURE: CPT | Performed by: EMERGENCY MEDICINE

## 2019-04-01 PROCEDURE — 85025 COMPLETE CBC W/AUTO DIFF WBC: CPT | Performed by: EMERGENCY MEDICINE

## 2019-04-01 PROCEDURE — 99285 EMERGENCY DEPT VISIT HI MDM: CPT | Mod: Z6 | Performed by: EMERGENCY MEDICINE

## 2019-04-01 PROCEDURE — 25000128 H RX IP 250 OP 636: Performed by: EMERGENCY MEDICINE

## 2019-04-01 PROCEDURE — 96374 THER/PROPH/DIAG INJ IV PUSH: CPT | Performed by: EMERGENCY MEDICINE

## 2019-04-01 PROCEDURE — 80048 BASIC METABOLIC PNL TOTAL CA: CPT | Performed by: EMERGENCY MEDICINE

## 2019-04-01 PROCEDURE — 81001 URINALYSIS AUTO W/SCOPE: CPT | Performed by: EMERGENCY MEDICINE

## 2019-04-01 PROCEDURE — 96361 HYDRATE IV INFUSION ADD-ON: CPT | Performed by: EMERGENCY MEDICINE

## 2019-04-01 PROCEDURE — 96375 TX/PRO/DX INJ NEW DRUG ADDON: CPT | Performed by: EMERGENCY MEDICINE

## 2019-04-01 PROCEDURE — 99285 EMERGENCY DEPT VISIT HI MDM: CPT | Mod: 25 | Performed by: EMERGENCY MEDICINE

## 2019-04-01 PROCEDURE — 96376 TX/PRO/DX INJ SAME DRUG ADON: CPT | Performed by: EMERGENCY MEDICINE

## 2019-04-01 PROCEDURE — 74176 CT ABD & PELVIS W/O CONTRAST: CPT

## 2019-04-01 RX ORDER — SODIUM CHLORIDE 9 MG/ML
1000 INJECTION, SOLUTION INTRAVENOUS CONTINUOUS
Status: DISCONTINUED | OUTPATIENT
Start: 2019-04-01 | End: 2019-04-01 | Stop reason: HOSPADM

## 2019-04-01 RX ORDER — HYDROCODONE BITARTRATE AND ACETAMINOPHEN 5; 325 MG/1; MG/1
1-2 TABLET ORAL EVERY 4 HOURS PRN
Qty: 8 TABLET | Refills: 0 | Status: SHIPPED | OUTPATIENT
Start: 2019-04-01 | End: 2019-08-20

## 2019-04-01 RX ORDER — KETOROLAC TROMETHAMINE 30 MG/ML
30 INJECTION, SOLUTION INTRAMUSCULAR; INTRAVENOUS ONCE
Status: COMPLETED | OUTPATIENT
Start: 2019-04-01 | End: 2019-04-01

## 2019-04-01 RX ORDER — ONDANSETRON 2 MG/ML
4 INJECTION INTRAMUSCULAR; INTRAVENOUS EVERY 30 MIN PRN
Status: DISCONTINUED | OUTPATIENT
Start: 2019-04-01 | End: 2019-04-01 | Stop reason: HOSPADM

## 2019-04-01 RX ORDER — HYDROMORPHONE HYDROCHLORIDE 1 MG/ML
0.5 INJECTION, SOLUTION INTRAMUSCULAR; INTRAVENOUS; SUBCUTANEOUS
Status: COMPLETED | OUTPATIENT
Start: 2019-04-01 | End: 2019-04-01

## 2019-04-01 RX ORDER — TAMSULOSIN HYDROCHLORIDE 0.4 MG/1
0.4 CAPSULE ORAL
Qty: 20 CAPSULE | Refills: 0 | Status: SHIPPED | OUTPATIENT
Start: 2019-04-01 | End: 2019-08-20

## 2019-04-01 RX ADMIN — Medication 0.5 MG: at 13:56

## 2019-04-01 RX ADMIN — Medication 0.5 MG: at 14:29

## 2019-04-01 RX ADMIN — ONDANSETRON 4 MG: 2 INJECTION INTRAMUSCULAR; INTRAVENOUS at 13:57

## 2019-04-01 RX ADMIN — KETOROLAC TROMETHAMINE 30 MG: 30 INJECTION, SOLUTION INTRAMUSCULAR at 14:11

## 2019-04-01 RX ADMIN — Medication 0.5 MG: at 14:11

## 2019-04-01 RX ADMIN — SODIUM CHLORIDE 1000 ML: 9 INJECTION, SOLUTION INTRAVENOUS at 13:57

## 2019-04-01 NOTE — ED PROVIDER NOTES
History     Chief Complaint   Patient presents with     RLQ pain     The history is provided by the patient.     Clint Alvarez is a 43 year old male who presents to the emergency department for abdominal pain. Patient reports having right lower quadrant pain. He states he was seen twice last week, diagnosed with a kidney stone, has been taking the medications given to him with no relief. He is having nausea with dry heaves. He has a history of a kidney stone that he passed by himself about 10-15 years ago.    Allergies:  Allergies   Allergen Reactions     No Known Drug Allergy        Problem List:    Patient Active Problem List    Diagnosis Date Noted     Morbid obesity (H) 2019     Priority: Medium     Moderate recurrent major depression (H), with mixed features 2019     Priority: Medium     Anxiety, unspecified 2019     Priority: Medium     Dysthymia 10/26/2018     Priority: Medium        Past Medical History:    Past Medical History:   Diagnosis Date     DDD (degenerative disc disease)      Gallstone 2012     Kidney stone      Lyme disease        Past Surgical History:    Past Surgical History:   Procedure Laterality Date     INSERT CHEST TUBE             Family History:    History reviewed. No pertinent family history.    Social History:  Marital Status:   [4]  Social History     Tobacco Use     Smoking status: Former Smoker     Packs/day: 0.50     Years: 20.00     Pack years: 10.00     Last attempt to quit: 2018     Years since quittin.5     Smokeless tobacco: Never Used   Substance Use Topics     Alcohol use: No     Drug use: No        Medications:      HYDROcodone-acetaminophen (NORCO) 5-325 MG tablet   tamsulosin (FLOMAX) 0.4 MG capsule   dexamethasone (DECADRON) 0.1 % ophthalmic susp   hydrocortisone (CORTAID) 1 % cream   losartan-hydrochlorothiazide (HYZAAR) 50-12.5 MG tablet   meloxicam (MOBIC) 15 MG tablet   oxyCODONE-acetaminophen (PERCOCET) 5-325 MG  tablet         Review of Systems   All other systems reviewed and are negative.      Physical Exam   BP: (!) 174/125  Pulse: 67  Temp: 97.7  F (36.5  C)  Resp: 18  Weight: 113.4 kg (250 lb)  SpO2: 99 %      Physical Exam   Constitutional: He is oriented to person, place, and time. He appears well-developed and well-nourished. No distress.   Patient is uncomfortable.   HENT:   Head: Normocephalic and atraumatic.   Eyes: Pupils are equal, round, and reactive to light. No scleral icterus.   Neck: Normal range of motion. Neck supple.   Cardiovascular: Normal rate and regular rhythm.   Pulmonary/Chest: Effort normal and breath sounds normal.   Abdominal: Soft. Bowel sounds are normal.   Musculoskeletal: Normal range of motion.   Neurological: He is alert and oriented to person, place, and time.   Skin: Skin is warm and dry. No rash noted. He is not diaphoretic.   Psychiatric: He has a normal mood and affect. His behavior is normal. Thought content normal.   Nursing note and vitals reviewed.      ED Course        Procedures               Critical Care time:  none               Results for orders placed or performed during the hospital encounter of 04/01/19 (from the past 24 hour(s))   CBC with platelets differential   Result Value Ref Range    WBC 12.1 (H) 4.0 - 11.0 10e9/L    RBC Count 5.62 4.4 - 5.9 10e12/L    Hemoglobin 16.0 13.3 - 17.7 g/dL    Hematocrit 47.2 40.0 - 53.0 %    MCV 84 78 - 100 fl    MCH 28.5 26.5 - 33.0 pg    MCHC 33.9 31.5 - 36.5 g/dL    RDW 12.7 10.0 - 15.0 %    Platelet Count 337 150 - 450 10e9/L    Diff Method Automated Method     % Neutrophils 75.8 %    % Lymphocytes 16.0 %    % Monocytes 6.2 %    % Eosinophils 0.8 %    % Basophils 0.8 %    % Immature Granulocytes 0.4 %    Nucleated RBCs 0 0 /100    Absolute Neutrophil 9.2 (H) 1.6 - 8.3 10e9/L    Absolute Lymphocytes 1.9 0.8 - 5.3 10e9/L    Absolute Monocytes 0.8 0.0 - 1.3 10e9/L    Absolute Basophils 0.1 0.0 - 0.2 10e9/L    Abs Immature  Granulocytes 0.1 0 - 0.4 10e9/L    Absolute Nucleated RBC 0.0    Basic metabolic panel   Result Value Ref Range    Sodium 142 133 - 144 mmol/L    Potassium 4.5 3.4 - 5.3 mmol/L    Chloride 111 (H) 94 - 109 mmol/L    Carbon Dioxide 20 20 - 32 mmol/L    Anion Gap 11 3 - 14 mmol/L    Glucose 133 (H) 70 - 99 mg/dL    Urea Nitrogen 16 7 - 30 mg/dL    Creatinine 1.20 0.66 - 1.25 mg/dL    GFR Estimate 72 >60 mL/min/[1.73_m2]    GFR Estimate If Black 84 >60 mL/min/[1.73_m2]    Calcium 8.8 8.5 - 10.1 mg/dL   CT Abdomen Pelvis w/o Contrast    Narrative    CT ABDOMEN AND PELVIS WITHOUT CONTRAST  4/1/2019 2:37 PM    HISTORY:  Urinary tract stone, known, no complications or risk  factors. Right flank pain. History of cholecystectomy. Known stone  from recent scan on right side above the urinary bladder.    TECHNIQUE: Scans obtained from the diaphragm through the pelvis  without oral or IV contrast.   Radiation dose for this scan was reduced using automated exposure  control, adjustment of the mA and/or kV according to patient size, or  iterative reconstruction technique.    COMPARISON:  CT abdomen/pelvis dated 3/22/2019.    FINDINGS:  There is mild dependent atelectasis in the posterior right  lung base. Visualized portions of lung bases and mediastinal contents  are otherwise grossly unremarkable. There are no aggressive osseous  lesions.    There appears to be geographic fatty infiltration of the liver,  similar to the prior study. Patient is status post cholecystectomy.  The liver, pancreas, spleen, and bilateral adrenal glands are  otherwise normal in appearance for noncontrast CT.    There is mild right hydronephrosis, enlargement of the right kidney  and right hydroureter with right perinephric stranding. This has very  slightly progressed since the prior study dated 3/22/2019. There is a  right ureteral calculus (image 93 series 2 and image 111 series 3)  measuring approximately 0.4 x 0.3 x 0.4 cm. This has very  slightly  progressed in positioning as compared to the prior study and is now  inferior to a phlebolith seen just lateral to this region in the  pelvis. It is otherwise unchanged since the prior study. There is mild  left renal pelvic prominence and intrarenal collecting system  prominence versus parapelvic cysts. Left ureter is of normal caliber.  No left ureteral calculus is identified. Urinary bladder is  unremarkable.    No adenopathy, free fluid or free air is seen in the peritoneal  cavity. Aorta is normal in appearance.    The colon is grossly normal in caliber. There are a few scattered  colonic diverticuli. No pericolonic inflammatory change to suggest  acute diverticulitis. Appendix is normal in appearance and extends  posteriorly from the cecum. Small bowel is of normal caliber. The  stomach contains a small amount of ingested material but is otherwise  unremarkable.      Impression    IMPRESSION:  1. At least partially obstructive distal right ureteral calculus  measures approximately 0.4 x 0.3 x 0.4 cm and appears approximately 1  cm progressed in the ureter as compared to the prior study. This is  causing at least partial obstruction.  2. Geographic fatty infiltration in the liver is again noted.  3. Evaluation of solid organs of the abdomen and pelvis is limited due  to lack of IV contrast.  4. No other changes.    DURGA MORENO MD   UA with Microscopic   Result Value Ref Range    Color Urine Yellow     Appearance Urine Clear     Glucose Urine Negative NEG^Negative mg/dL    Bilirubin Urine Negative NEG^Negative    Ketones Urine Negative NEG^Negative mg/dL    Specific Gravity Urine 1.025 1.003 - 1.035    Blood Urine Moderate (A) NEG^Negative    pH Urine 5.0 5.0 - 7.0 pH    Protein Albumin Urine Negative NEG^Negative mg/dL    Urobilinogen mg/dL 0.0 0.0 - 2.0 mg/dL    Nitrite Urine Negative NEG^Negative    Leukocyte Esterase Urine Negative NEG^Negative    Source Unspecified Urine     WBC Urine 1 0 - 5  /HPF    RBC Urine 4 (H) 0 - 2 /HPF    Mucous Urine Present (A) NEG^Negative /LPF    Hyaline Casts 1 0 - 2 /LPF       Medications   0.9% sodium chloride BOLUS (0 mLs Intravenous Stopped 4/1/19 1433)   HYDROmorphone (PF) (DILAUDID) injection 0.5 mg (0.5 mg Intravenous Given 4/1/19 1429)   ketorolac (TORADOL) injection 30 mg (30 mg Intravenous Given 4/1/19 1411)       Assessments & Plan (with Medical Decision Making)  43-year-old male with right ureterolithiasis.  Intermittent pain over the last 12 days.  Repeat CT shows a small progression.  Urinalysis without signs of infection.  Pain was controlled here.  His case was reviewed with the urologist, Dr. Barreto.  He recommended twice daily Flomax.  Also recommended NSAIDs.  Given Norco if needed for severe pain.  Refer to urology if not improving over the next few days.  Return anytime sooner to the emergency department condition worsens or other concern.     I have reviewed the nursing notes.    I have reviewed the findings, diagnosis, plan and need for follow up with the patient.          Medication List      Started    HYDROcodone-acetaminophen 5-325 MG tablet  Commonly known as:  NORCO  1-2 tablets, Oral, EVERY 4 HOURS PRN        Modified    tamsulosin 0.4 MG capsule  Commonly known as:  FLOMAX  0.4 mg, Oral, 2 TIMES DAILY.  What changed:  when to take this            Final diagnoses:   Ureterolithiasis     This document serves as a record of services personally performed by Ramsey Cabrera MD. It was created on their behalf by Christine Morataya, a trained medical scribe. The creation of this record is based on the provider's personal observations and the statements of the patient. This document has been checked and approved by the attending provider.    Note: Chart documentation done in part with Dragon Voice Recognition software. Although reviewed after completion, some word and grammatical errors may remain.    4/1/2019   [facility] EMERGENCY DEPARTMENT      Ramsey Cabrera MD  04/01/19 2947

## 2019-04-01 NOTE — ED AVS SNAPSHOT
[facility] Emergency Department  [address] [place] [address]  Phone:  [phone]  Fax:  [phone]                                    Clint Alvarez   MRN: [id number]    Department:  [facility] Emergency Department   Date of Visit:  4/1/2019           After Visit Summary Signature Page    I have received my discharge instructions, and my questions have been answered. I have discussed any challenges I see with this plan with the nurse or doctor.    ..........................................................................................................................................  Patient/Patient Representative Signature      ..........................................................................................................................................  Patient Representative Print Name and Relationship to Patient    ..................................................               ................................................  Date                                   Time    ..........................................................................................................................................  Reviewed by Signature/Title    ...................................................              ..............................................  Date                                               Time          22EPIC Rev 08/18

## 2019-04-01 NOTE — ED TRIAGE NOTES
Pt states he was dx with kidney stone last wk and has been taking the flomax and meloxicam but sx have not gotten worse/better.      3/22/19 CT:  1. Right ureteral distal 0.3 cm obstructing stone at the level of the  sciatic notch with associated mild right hydronephrosis.

## 2019-04-01 NOTE — TELEPHONE ENCOUNTER
Armando was in the ER on 3/22/2019 and diagnosed with a kidney stone.    He still hasn't passed it and is in a lot of pain.  He's unsure what he should do.    I instructed he return to the ER and that someone else drive.    He's going to [facility] ER.  Mery SEQUEIRA RN [facility]

## 2019-04-04 ENCOUNTER — TELEPHONE (OUTPATIENT)
Dept: FAMILY MEDICINE | Facility: OTHER | Age: 44
End: 2019-04-04

## 2019-04-04 NOTE — TELEPHONE ENCOUNTER
Panel Management Review      Patient has the following on his problem list:       Composite cancer screening  Chart review shows that this patient is due/due soon for the following   Summary:    Patient is due/failing the following:   Blood pressure    Action needed:   Patient needs nurse only appointment.    Type of outreach:    Sent [employer] message.    Questions for provider review:    None                                                                                                                                    Cally BOWENS LPN       Chart routed to Cally BOWENS LPN   .

## 2019-04-10 ENCOUNTER — ALLIED HEALTH/NURSE VISIT (OUTPATIENT)
Dept: FAMILY MEDICINE | Facility: OTHER | Age: 44
End: 2019-04-10
Payer: COMMERCIAL

## 2019-04-10 VITALS — HEART RATE: 76 BPM | SYSTOLIC BLOOD PRESSURE: 138 MMHG | DIASTOLIC BLOOD PRESSURE: 82 MMHG

## 2019-04-10 DIAGNOSIS — I10 BENIGN ESSENTIAL HYPERTENSION: Primary | ICD-10-CM

## 2019-04-10 PROCEDURE — 99207 ZZC NO CHARGE NURSE ONLY: CPT

## 2019-04-10 NOTE — NURSING NOTE
I talked with the Rn nurse and she went in to talk with the patient.     Blood pressure taken with large adult cuff.

## 2019-04-10 NOTE — PROGRESS NOTES
This RN was asked to help answer some of patient's questions regarding medication, B/P, and weight.  After speaking with patient and helping to answer questions he was presenting, I took his B/P for his second reading.  B/P reading at this time is 138/82.  Patient denies any symptoms of hypotension.      Will close encounter at this time.    Mary Rojo RN

## 2019-05-20 ENCOUNTER — HOSPITAL ENCOUNTER (OUTPATIENT)
Dept: NUTRITION | Facility: CLINIC | Age: 44
Discharge: HOME | End: 2019-05-20
Attending: PHYSICIAN ASSISTANT | Admitting: PHYSICIAN ASSISTANT
Payer: COMMERCIAL

## 2019-05-20 PROCEDURE — 97802 MEDICAL NUTRITION INDIV IN: CPT | Performed by: DIETITIAN, REGISTERED

## 2019-05-21 VITALS — WEIGHT: 259 LBS | BODY MASS INDEX: 40.87 KG/M2

## 2019-05-21 NOTE — PROGRESS NOTES
"[facility]  Medical Nutrition Therapy    Visit Type: Initial Assessment    Clint Alvarez referred by Israel Matthews PA-C for MNT related to morbid obesity    Nutrition Assessment:  Anthropometrics  Height: 5' 7\"       Weight: 259 lb/ 117.5 kg  (wt taken at apt)       IBW (kg): 148 lb/67 kg    BMI:  40.9  (obesity class II)      Wt Readings from Last 7 Encounters:   05/21/19 117.5 kg (259 lb)   04/01/19 113.4 kg (250 lb)   03/24/19 113.4 kg (250 lb)   03/22/19 113.4 kg (250 lb)   02/28/19 116 kg (255 lb 11.2 oz)   12/04/18 110.4 kg (243 lb 4.8 oz)   10/26/18 104.6 kg (230 lb 11.2 oz)   -Pt has gained 29 lb/12.6% BW over the past 6 months    Nutrition History  -Pt reports that he had his gallbladder removed in Sept. '18, and since then, he's been gaining wt. He also quit smoking at this time.   -Pt states that he's gained 50 lb, but records show he's gained 29 lb over the past 6 months  -Currently not working d/t fatigue; states that he's tired all the time, and this started, following his gallbladder removal/quitting smoking  -Says that he's 'too poor' to buy healthy foods like whole grains  -Understands what foods are considered healthy choices and which ones aren't, but blames financial troubles for not always making healthy choices  -Pt used to work for the [employer] and would go 9 hrs w/o eating, and he continues this habit despite not working    Physical Activity  -No regular moderate intensity exercise  -Plays a [employer] game that requires him to walk around outside- states that this gets his HR elevated and dances while cleaning the house several times per week.   -He knows that he needs to exercise more, but he uses tiredness and embarrassment as an excuse.   -Pt reports having PTSD and fears being in public- will not go for walks outside, also won't exercise at home d/t fear that his son will make fun of him   -Has a treadmill at home but doesn't use it because his son is home from " school and sleeps in the basement where the treadmill is located      Nutrition Prescription  Energy: 2579-5548 kcal/day  (25-30 kcal/kg Adj. BW)   Protein: 64-80 g/day  (0.8-1 g/kg Adj. BW)   Fluid: 4550-2614 mL/day  (1 mL/kcal)           Food Record  B: Skips about 4 out of 7 days per week. If he eats, it's 2 eggs on white bread or oatmeal w/brown sugar, 8oz coffee w/2-3 Tb creamer  10am: protein powder w/skim milk, 8oz coffee w/2-3 Tb creamer  12pm: leftovers from dinner- always a spinach salad, 8oz coffee w/2-3 Tb creamer  6pm: chicken breast and green leafy salad w/2 Tb vinaigrette dressing  Snack: popcorn, plain, no butter or salt  Fluids: 64 oz water and 12 cups total of regular coffee daily  -Usual dietary intake appears inadequate in overall kcal, adequate in protein, veggies, and dairy; excessive in caffeine, inadequate in fluids, fruit, whole-grains  -Doesn't eat out ever d/t financial reasons, always eats at home, reports his pace as fast, but doesn't eat past his full point; monitors his portions     Nutrition Diagnosis:  PES: Obesity Class II r/t sedentary lifestyle aeb low daily PA, inadequate daily kcal intake, hx of crash dieting, and BMI 40.9    Nutrition Intervention:  -Educated pt on using MyPlate for meal planning and discussed portion sizes   -Discussed recommended and not recommended foods (choosing WGs, lean meats, low-fat dairy, fresh fruits & veggies, unsat fats, and limiting high-sodium and refined sugar foods)  -Educated pt on metabolism and used the fire analogy; talked about the importance of consuming consistent meals/snacks throughout the day and listening to hunger/fullness cues (handout provided)  -Educated pt on reading food labels  -Encouraged pt to drink more water throughout the day and less caffeine, as he may be 'crashing' from extreme caffeine use  -Encouraged pt to increase daily PA- include cardiovascular and weight bearing activities w/ultimate goal of 60 min per day;  discussed the role of muscle mass and REE and how it can increase energy each day; suggested starting the day w/exercise either on treadmill (move it to a location away from sleeping son) or download a workout video on YouTube and exercise in private per his preference. Also talked about finding PAs that he and his son can do together.   -Talked about dieting and how having a hx of going long periods of time fasting can interfere w/metabolism. Discussed how to get metabolism back up. Also talked about smoking cessation and how it's linked to wt gain.      Nutrition Goals:  1) Start listening to hunger/fullness cues and eat small frequent meals  2) Increase PA to 60 min per day of moderate intensity exercise  3) Start meditating to ease anxiety   4) Increase water intake to 79 oz per day   5) Lose 1-2 lb per week     Nutrition Follow Up / Monitoring:  PA, weight, PO intake of meals and fluids     Nutrition Recommendations:  Patient to follow-up with RD in 4 weeks.  Patient has RD contact information to call/email if needed.    Start Time: 10:46  End Time: 11:52      Kaylah Yeung RD, LD  Clinical Dietitian  [facility]: [phone]  [facility]: [phone]

## 2019-06-05 ENCOUNTER — TELEPHONE (OUTPATIENT)
Dept: FAMILY MEDICINE | Facility: OTHER | Age: 44
End: 2019-06-05

## 2019-06-05 NOTE — TELEPHONE ENCOUNTER
Patient is due for a PHQ-9.  Index start date:2/25/2019  Index end date:6/25/2019    Please call patient. Pt is active on [employer]. I have sent a PHQ-9 via [employer] and will postpone the encounter. Julia Elam CMA ([facility])

## 2019-06-15 ENCOUNTER — OFFICE VISIT (OUTPATIENT)
Dept: URGENT CARE | Facility: RETAIL CLINIC | Age: 44
End: 2019-06-15
Payer: COMMERCIAL

## 2019-06-15 VITALS
DIASTOLIC BLOOD PRESSURE: 93 MMHG | HEART RATE: 77 BPM | SYSTOLIC BLOOD PRESSURE: 138 MMHG | TEMPERATURE: 98.9 F | OXYGEN SATURATION: 96 %
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
## 2019-06-15 PROCEDURE — 99213 OFFICE O/P EST LOW 20 MIN: CPT | Performed by: NURSE PRACTITIONER

## 2019-06-15 RX ORDER — LISINOPRIL AND HYDROCHLOROTHIAZIDE 12.5; 2 MG/1; MG/1
1 TABLET ORAL DAILY
Refills: 1 | COMMUNITY
Start: 2019-02-28 | End: 2019-08-25

## 2019-06-15 ASSESSMENT — ENCOUNTER SYMPTOMS
COUGH: 0
FEVER: 0
SORE THROAT: 0
WHEEZING: 0
NAUSEA: 0
CHILLS: 0
VOMITING: 0

## 2019-06-15 NOTE — PATIENT INSTRUCTIONS
"  Patient Education     Insect, Spider, and Scorpion Bites and Stings     The black  (top) and brown recluse (bottom) are two poisonous spiders found in the [place].     Most insect bites are harmless and cause only minor swelling or itching. But if you re allergic to insects such as wasps or bees, a sting can cause a life-threatening allergic reaction. Some ticks can carry and transmit serious diseases. The venom (poison) from scorpions and certain spiders can also be deadly, although this is rare. Knowing when to seek emergency care could save your life.  When to go to the emergency room (ER)    Scorpion sting    Bite from a black, red, or brown  spider or brown recluse spider    Severe pain or swelling at the site of bite    A tick that is embedded in your skin and can not be easily removed at home    Signs of an allergic reaction such as:  ? Hives  ? Swelling of your eyes, lips, or the inside of your throat  ? Trouble breathing  ? Dizziness or confusion  What to expect in the ER    If you re having trouble breathing, you ll be given oxygen through a mask. In case of severe breathing difficulty, you may have a tube inserted in your throat and be placed on a ventilator (breathing machine).    If you are having a severe allergic reaction from a sting (called anaphylaxis), you may be given a shot of epinephrine. If it is known that you are allergic to bee or wasp stings, your doctor may give you a prescription for an \"epi-pen\" that you can keep with you at all times in case of a sting.    You may receive antivenin (a substance that reverses the effects of poison) for some spider bites and scorpion stings. Because antivenin can sometimes cause other problems, your doctor will weigh the risks and benefits of this treatment.    Steroids such as prednisone are often used to treat allergic reactions. In many cases, your doctor will also prescribe an antihistamine to help relieve itching.  Easing symptoms " of an insect bite or sting    Try to remove a stinger you can see. Use your fingernail, a knife edge, or credit card to scrape against the skin. Do not squeeze or pull.    Apply ice or a cold compress to reduce pain and swelling (keep a thin cloth between the cold source and the skin).   Date Last Reviewed: 12/1/2016 2000-2018 [employer]. [address] [place] [address], [place] [address]. All rights reserved. This information is not intended as a substitute for professional medical care. Always follow your healthcare professional's instructions.  Follow up 24 hours
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
## 2019-06-21 ASSESSMENT — PATIENT HEALTH QUESTIONNAIRE - PHQ9: SUM OF ALL RESPONSES TO PHQ QUESTIONS 1-9: 0

## 2019-06-21 NOTE — TELEPHONE ENCOUNTER
Pt completed a PHQ-9.    PHQ-9 SCORE 6/21/2019   PHQ-9 Total Score MyChart -   PHQ-9 Total Score 0     Julia Elam CMA ([facility])

## 2019-08-12 ENCOUNTER — TELEPHONE (OUTPATIENT)
Dept: FAMILY MEDICINE | Facility: OTHER | Age: 44
End: 2019-08-12

## 2019-08-12 NOTE — TELEPHONE ENCOUNTER
Panel Management Review      Patient has the following on his problem list:     Hypertension   Last three blood pressure readings:  BP Readings from Last 3 Encounters:   06/15/19 (!) 138/93   04/10/19 138/82   04/01/19 142/68     Blood pressure: FAILED    HTN Guidelines:  Less than 140/90      Composite cancer screening  Chart review shows that this patient is due/due soon for the following None  Summary:    Patient is due/failing the following:   BP CHECK    Action needed:   Patient needs nurse only appointment.    Type of outreach:    Sent [employer] message.    Questions for provider review:    None                                                                                                                                    Rufina Rasmussen MA

## 2019-08-20 ENCOUNTER — ALLIED HEALTH/NURSE VISIT (OUTPATIENT)
Dept: FAMILY MEDICINE | Facility: OTHER | Age: 44
End: 2019-08-20
Payer: COMMERCIAL

## 2019-08-20 VITALS — DIASTOLIC BLOOD PRESSURE: 86 MMHG | HEART RATE: 76 BPM | SYSTOLIC BLOOD PRESSURE: 130 MMHG

## 2019-08-20 DIAGNOSIS — Z01.30 BP CHECK: Primary | ICD-10-CM

## 2019-08-20 PROCEDURE — 99207 ZZC NO CHARGE NURSE ONLY: CPT

## 2019-08-20 RX ORDER — NIACIN 100 MG
TABLET ORAL
COMMUNITY
End: 2023-10-27

## 2019-08-20 RX ORDER — MULTIVIT-MIN/IRON/FOLIC ACID/K 18-600-40
2000 CAPSULE ORAL DAILY
COMMUNITY
End: 2023-10-27

## 2019-08-20 RX ORDER — ASPIRIN 81 MG/1
81 TABLET ORAL DAILY
COMMUNITY

## 2019-08-20 ASSESSMENT — PAIN SCALES - GENERAL: PAINLEVEL: NO PAIN (0)

## 2019-08-25 ENCOUNTER — APPOINTMENT (OUTPATIENT)
Dept: GENERAL RADIOLOGY | Facility: CLINIC | Age: 44
End: 2019-08-25
Attending: FAMILY MEDICINE
Payer: COMMERCIAL

## 2019-08-25 ENCOUNTER — HOSPITAL ENCOUNTER (EMERGENCY)
Facility: CLINIC | Age: 44
Discharge: HOME | End: 2019-08-25
Attending: FAMILY MEDICINE | Admitting: FAMILY MEDICINE
Payer: COMMERCIAL

## 2019-08-25 VITALS
DIASTOLIC BLOOD PRESSURE: 96 MMHG | OXYGEN SATURATION: 95 % | BODY MASS INDEX: 39.71 KG/M2 | TEMPERATURE: 99.3 F | HEIGHT: 68 IN | WEIGHT: 262 LBS | SYSTOLIC BLOOD PRESSURE: 127 MMHG | RESPIRATION RATE: 18 BRPM | HEART RATE: 79 BPM

## 2019-08-25 DIAGNOSIS — S82.892A CLOSED AVULSION FRACTURE OF LEFT ANKLE, INITIAL ENCOUNTER: ICD-10-CM

## 2019-08-25 PROCEDURE — 99284 EMERGENCY DEPT VISIT MOD MDM: CPT | Mod: 25 | Performed by: FAMILY MEDICINE

## 2019-08-25 PROCEDURE — 73610 X-RAY EXAM OF ANKLE: CPT | Mod: TC,LT

## 2019-08-25 PROCEDURE — 27824 TREAT LOWER LEG FRACTURE: CPT | Mod: LT | Performed by: FAMILY MEDICINE

## 2019-08-25 ASSESSMENT — MIFFLIN-ST. JEOR: SCORE: 2042.92

## 2019-08-25 NOTE — ED AVS SNAPSHOT
[facility] Emergency Department  [address] [place] [address]  Phone:  [phone]  Fax:  [phone]                                    Clint Alvarez   MRN: [id number]    Department:  [facility] Emergency Department   Date of Visit:  8/25/2019           After Visit Summary Signature Page    I have received my discharge instructions, and my questions have been answered. I have discussed any challenges I see with this plan with the nurse or doctor.    ..........................................................................................................................................  Patient/Patient Representative Signature      ..........................................................................................................................................  Patient Representative Print Name and Relationship to Patient    ..................................................               ................................................  Date                                   Time    ..........................................................................................................................................  Reviewed by Signature/Title    ...................................................              ..............................................  Date                                               Time          22EPIC Rev 08/18

## 2019-08-25 NOTE — ED TRIAGE NOTES
[place] [facility] [place] [facility] and is now unable to walk on it and the pain kept him awake all night.

## 2019-08-25 NOTE — ED PROVIDER NOTES
"  History     Chief Complaint   Patient presents with     Ankle Pain     HPI  Clint Alvarez is a 44 year old male who presents with concerns of left ankle pain.  Patient states a few days ago he was walking and fell and felt a pop in his ankle.  He has had some pain in that area but has not been able to walk on it.  He noticed today that the pain was a lot more significant.  Denies any extremity numbness or tingling.  Patient states he is never significantly injured that ankle before.    Allergies:  Allergies   Allergen Reactions     No Known Drug Allergy        Problem List:    Patient Active Problem List    Diagnosis Date Noted     Morbid obesity (H) 2019     Priority: Medium     Moderate recurrent major depression (H), with mixed features 2019     Priority: Medium     Anxiety, unspecified 2019     Priority: Medium     Dysthymia 10/26/2018     Priority: Medium        Past Medical History:    Past Medical History:   Diagnosis Date     DDD (degenerative disc disease)      Gallstone 2012     Kidney stone      Lyme disease        Past Surgical History:    Past Surgical History:   Procedure Laterality Date     INSERT CHEST TUBE             Family History:    History reviewed. No pertinent family history.    Social History:  Marital Status:   [4]  Social History     Tobacco Use     Smoking status: Former Smoker     Packs/day: 0.50     Years: 20.00     Pack years: 10.00     Last attempt to quit: 2018     Years since quittin.9     Smokeless tobacco: Never Used   Substance Use Topics     Alcohol use: No     Drug use: No        Medications:      aspirin 81 MG EC tablet   niacin 100 MG tablet   Vitamin D, Cholecalciferol, 1000 units TABS         Review of Systems   All other systems reviewed and are negative.      Physical Exam   BP: (!) 127/99  Pulse: 81  Temp: 99  F (37.2  C)  Resp: 18  Height: 172.7 cm (5' 8\")  Weight: 118.8 kg (262 lb)  SpO2: 97 %      Physical Exam "   Constitutional: He appears well-developed and well-nourished. No distress.   Musculoskeletal:        Left ankle: He exhibits normal range of motion, no swelling, no ecchymosis, no deformity, no laceration and normal pulse. Tenderness (Anterior ankle).   Skin: He is not diaphoretic.   Nursing note and vitals reviewed.      ED Course        Procedures           Results for orders placed or performed during the hospital encounter of 08/25/19 (from the past 24 hour(s))   XR Ankle Left G/E 3 Views    Narrative    ANKLE THREE VIEWS LEFT 8/25/2019 7:47 AM     HISTORY: ankle pain, injury    COMPARISON: None.      Impression    IMPRESSION: Elongated 0.4 x 0.2 cm calcification anterior to  tibiotalar joint on lateral film of indeterminate age. Small fracture  fragment cannot be excluded. Clinical correlation. Otherwise no  evidence for acute fracture or dislocation.    GLORIA NICOLE MD       Medications - No data to display  X-ray shows a questionable avulsion fracture of the ankle.  This is the area where he is mostly tender.  When I placed the patient in a walking boot for immobilization.  We will have the patient follow-up with podiatry.  Patient will keep the area elevated when possible.  Patient will use Tylenol and/or ibuprofen for pain.    Assessments & Plan (with Medical Decision Making)  Closed avulsion fracture of the left ankle     I have reviewed the nursing notes.    I have reviewed the findings, diagnosis, plan and need for follow up with the patient.      Discharge Medication List as of 8/25/2019  8:51 AM          Final diagnoses:   Closed avulsion fracture of left ankle, initial encounter       8/25/2019   [facility] EMERGENCY DEPARTMENT     Kojo Calvin MD  08/25/19 [id number]

## 2019-08-28 ENCOUNTER — OFFICE VISIT (OUTPATIENT)
Dept: PODIATRY | Facility: CLINIC | Age: 44
End: 2019-08-28
Payer: COMMERCIAL

## 2019-08-28 VITALS
BODY MASS INDEX: 40.02 KG/M2 | SYSTOLIC BLOOD PRESSURE: 114 MMHG | DIASTOLIC BLOOD PRESSURE: 78 MMHG | WEIGHT: 255 LBS | HEIGHT: 67 IN

## 2019-08-28 DIAGNOSIS — S93.491A SPRAIN OF ANTERIOR TALOFIBULAR LIGAMENT OF RIGHT ANKLE, INITIAL ENCOUNTER: Primary | ICD-10-CM

## 2019-08-28 PROCEDURE — 99243 OFF/OP CNSLTJ NEW/EST LOW 30: CPT | Performed by: PODIATRIST

## 2019-08-28 ASSESSMENT — PAIN SCALES - GENERAL: PAINLEVEL: MILD PAIN (3)

## 2019-08-28 ASSESSMENT — MIFFLIN-ST. JEOR: SCORE: 1991.33
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
## 2019-09-11 ENCOUNTER — OFFICE VISIT (OUTPATIENT)
Dept: PODIATRY | Facility: CLINIC | Age: 44
End: 2019-09-11
Payer: COMMERCIAL

## 2019-09-11 VITALS
HEIGHT: 67 IN | SYSTOLIC BLOOD PRESSURE: 124 MMHG | BODY MASS INDEX: 40.02 KG/M2 | DIASTOLIC BLOOD PRESSURE: 90 MMHG | WEIGHT: 255 LBS

## 2019-09-11 DIAGNOSIS — S93.491A SPRAIN OF ANTERIOR TALOFIBULAR LIGAMENT OF RIGHT ANKLE, INITIAL ENCOUNTER: Primary | ICD-10-CM

## 2019-09-11 PROCEDURE — 99213 OFFICE O/P EST LOW 20 MIN: CPT | Performed by: PODIATRIST

## 2019-09-11 ASSESSMENT — PAIN SCALES - GENERAL: PAINLEVEL: EXTREME PAIN (8)

## 2019-09-11 ASSESSMENT — MIFFLIN-ST. JEOR: SCORE: 1991.33

## 2019-09-11 NOTE — PROGRESS NOTES
"Chief Complaint   Patient presents with     RECHECK     (3w5d) WB w/tall gray boot, intermittent swelling and pain 8 - Right ankle sprain, DOI 8/17/19; LOV 8/28/2019       Weight management plan: Patient was referred to their PCP to discuss a diet and exercise plan.     Clint to follow up with Primary Care provider regarding elevated blood pressure.    HPI:  Clint Alvarez is a 44 year old male who is seen in consultation at the request of ED DEPT - Israel Matthews PA-C.    Pt presents for eval of:   (Onset, Location, L/R, Character, Treatments, Injury if yes)    XR Left ankle 8/25/2019     Onset 8/17/2019, lateral Left foot and ankle, tripped on a rock and rolled Left ankle fell and felt a \"pop\". Presents today with WB w/tall gasca fx boot.  Constant, swelling, bruising, sharp, throbbing, burning, pain 3-8  Naproxen, ice, elevation, rest    Full time student for phychology     EXAM:Vitals: BP (!) 124/90 (BP Location: Left arm, Cuff Size: Adult Large)   Ht 1.695 m (5' 6.75\")   Wt 115.7 kg (255 lb)   BMI 40.24 kg/m    BMI= Body mass index is 40.24 kg/m .    General appearance: Patient is alert and fully cooperative with history & exam.  No sign of distress is noted during the visit.     Psychiatric: Affect is pleasant & appropriate.  Patient appears motivated to improve health.     Respiratory: Breathing is regular & unlabored while sitting.     HEENT: Hearing is intact to spoken word.  Speech is clear.  No gross evidence of visual impairment that would impact ambulation.     Vascular: DP & PT pulses are intact & regular bilaterally.  No significant edema or varicosities noted.  CFT and skin temperature is normal to both lower extremities.     Neurologic: Lower extremity sensation is intact to light touch.  No evidence of weakness or contracture in the lower extremities.  No evidence of neuropathy.    Dermatologic: Skin is intact to both lower extremities with adequate texture, turgor and tone about the " integument.  No paronychia or evidence of soft tissue infection is noted.     Musculoskeletal: Patient is ambulatory with fracture boot.  Much reduced edema but this remains symptomatic and guarded at end dorsiflexion and inversion.  Discomfort is localized to the lateral ATF left ankle.    Radiographs are consistent with avulsion of the anterior lateral ankle.  No joint diastases.  No obvious change in the fifth metatarsal base on these ankle images.     ASSESSMENT:       ICD-10-CM    1. Sprain of anterior talofibular ligament of right ankle, initial encounter S93.491A PHYSICAL THERAPY REFERRAL        PLAN:  Reviewed patient's chart in [facility].      8/28/2019   Interpreted x-rays  Order for roll about at patient's request as he is having challenges with the crutches.    Can begin weightbearing as tolerated in the fracture boot  Fracture boot for all WB and during rest 24/7  Follow up in 2 weeks, he did feel a pop and nausea and likely an ATF rupture  Patient is a student at this time no specific restrictions other than weightbearing to tolerance in the fracture boot    We will monitor function of the peroneal tendons upon next visit to confirm no rupture is noted.  They are guarded today.    9/11/2019  Order to begin PT and may progress as tolerated.  Weightbearing as tolerated in the fracture boot, PT may remove the fracture boot and progress as tolerated  Letter for parking pass handicap up to 3 months  Progressed to regular sturdy stiff shoe gear if tolerated otherwise remain in the fracture boot until no pain or edema is noted in PT progresses  Follow-up in 3 weeks for reevaluation.      Han Solorio DPM

## 2019-09-16 ENCOUNTER — ALLIED HEALTH/NURSE VISIT (OUTPATIENT)
Dept: FAMILY MEDICINE | Facility: OTHER | Age: 44
End: 2019-09-16
Payer: COMMERCIAL

## 2019-09-16 VITALS — SYSTOLIC BLOOD PRESSURE: 117 MMHG | DIASTOLIC BLOOD PRESSURE: 88 MMHG

## 2019-09-16 DIAGNOSIS — Z01.30 BP CHECK: Primary | ICD-10-CM

## 2019-09-16 DIAGNOSIS — Z23 NEED FOR PROPHYLACTIC VACCINATION AND INOCULATION AGAINST INFLUENZA: ICD-10-CM

## 2019-09-16 PROCEDURE — 99207 ZZC NO CHARGE NURSE ONLY: CPT

## 2019-09-16 PROCEDURE — 90686 IIV4 VACC NO PRSV 0.5 ML IM: CPT

## 2019-09-16 PROCEDURE — 90471 IMMUNIZATION ADMIN: CPT

## 2019-09-16 NOTE — PROGRESS NOTES
Clint is a 44 year old male who comes in today for a blood pressure check because of monitoring bp.  Patient is not taking medication as prescribed  Patient n/a tolerating medications well.  Current complaints:   Patient is monitoring Blood Pressure elsewhere.  BP (!) 135/93     Vitals as recorded, a x-large cuff was used.  left arm  BP Readings from Last 4 Encounters:   09/16/19 (!) 135/93   09/11/19 (!) 124/90   08/28/19 114/78   08/25/19 (!) 127/96       Health Maintenance Due   Topic Date Due     HIV SCREENING  07/07/1988     INFLUENZA VACCINE (1) 09/01/2019       Disposition: Israel Matthews notified while patient in the clinic, patient instructed to schedule appointment to discuss bp patient stated that he will need to check his schedule,    Ann Ortiz MA

## 2019-09-29 ENCOUNTER — HEALTH MAINTENANCE LETTER (OUTPATIENT)
Age: 44
End: 2019-09-29

## 2019-10-02 ENCOUNTER — OFFICE VISIT (OUTPATIENT)
Dept: PODIATRY | Facility: CLINIC | Age: 44
End: 2019-10-02
Payer: COMMERCIAL

## 2019-10-02 ENCOUNTER — HOSPITAL ENCOUNTER (OUTPATIENT)
Dept: PHYSICAL THERAPY | Facility: CLINIC | Age: 44
Setting detail: THERAPIES SERIES
End: 2019-10-02
Attending: PODIATRIST
Payer: COMMERCIAL

## 2019-10-02 VITALS
SYSTOLIC BLOOD PRESSURE: 132 MMHG | WEIGHT: 255 LBS | HEIGHT: 67 IN | BODY MASS INDEX: 40.02 KG/M2 | DIASTOLIC BLOOD PRESSURE: 86 MMHG

## 2019-10-02 DIAGNOSIS — S93.491A SPRAIN OF ANTERIOR TALOFIBULAR LIGAMENT OF RIGHT ANKLE, INITIAL ENCOUNTER: ICD-10-CM

## 2019-10-02 DIAGNOSIS — S93.491A SPRAIN OF ANTERIOR TALOFIBULAR LIGAMENT OF RIGHT ANKLE, INITIAL ENCOUNTER: Primary | ICD-10-CM

## 2019-10-02 PROCEDURE — 99213 OFFICE O/P EST LOW 20 MIN: CPT | Performed by: PODIATRIST

## 2019-10-02 PROCEDURE — 97161 PT EVAL LOW COMPLEX 20 MIN: CPT | Mod: GP | Performed by: PHYSICAL THERAPIST

## 2019-10-02 PROCEDURE — 97110 THERAPEUTIC EXERCISES: CPT | Mod: GP | Performed by: PHYSICAL THERAPIST

## 2019-10-02 ASSESSMENT — PAIN SCALES - GENERAL: PAINLEVEL: NO PAIN (0)

## 2019-10-02 ASSESSMENT — MIFFLIN-ST. JEOR: SCORE: 1991.33

## 2019-10-02 NOTE — LETTER
"    10/2/2019         RE: Clint Alvarez  [address]  [facility] [address]        Dear Colleague,    Thank you for referring your patient, Clint Alvarez, to the [facility]. Please see a copy of my visit note below.    Chief Complaint   Patient presents with     RECHECK     (6w5d) WB w/tall gray boot, intermittent swelling and pain 3, has not gone to PT yet - Right ankle sprain, DOI 8/17/19; LOV 9/11/2019       Weight management plan: Patient was referred to their PCP to discuss a diet and exercise plan.     HPI:  XR Left ankle 8/25/2019  Onset 8/17/2019, lateral Left foot and ankle, tripped on a rock and rolled Left ankle fell and felt a \"pop\". Presents today with WB w/tall gasca fx boot.  Constant, swelling, bruising, sharp, throbbing, burning, pain 3-8  Naproxen, ice, elevation, rest    Patient states that he is not been able to get into physical therapy in the last month.    Full time student for phychology     EXAM:Vitals: /86 (BP Location: Left arm, Cuff Size: Adult Large)   Ht 1.695 m (5' 6.75\")   Wt 115.7 kg (255 lb)   BMI 40.24 kg/m     BMI= Body mass index is 40.24 kg/m .    General appearance: Patient is alert and fully cooperative with history & exam.  No sign of distress is noted during the visit.     Psychiatric: Affect is pleasant & appropriate.  Patient appears motivated to improve health.     Respiratory: Breathing is regular & unlabored while sitting.     HEENT: Hearing is intact to spoken word.  Speech is clear.  No gross evidence of visual impairment that would impact ambulation.     Vascular: DP & PT pulses are intact & regular bilaterally.  No significant edema or varicosities noted.  CFT and skin temperature is normal to both lower extremities.     Neurologic: Lower extremity sensation is intact to light touch.  No evidence of weakness or contracture in the lower extremities.  No evidence of neuropathy.    Dermatologic: Skin is intact to both lower extremities " with adequate texture, turgor and tone about the integument.  No paronychia or evidence of soft tissue infection is noted.     Musculoskeletal: Patient is ambulatory with fracture boot.  Minimal if any edema noted and it is very soft about the lateral right ankle.  No peroneal subluxation.  He is able to perform unilateral toe raise on the right but not the left.    Radiographs are consistent with avulsion of the anterior lateral ankle.  No joint diastases.  No obvious change in the fifth metatarsal base on these ankle images.     ASSESSMENT:       ICD-10-CM    1. Sprain of anterior talofibular ligament of right ankle, initial encounter S93.491A         PLAN:  Reviewed patient's chart in [facility].      8/28/2019   Interpreted x-rays  Order for roll about at patient's request as he is having challenges with the crutches.    Can begin weightbearing as tolerated in the fracture boot  Fracture boot for all WB and during rest 24/7  Follow up in 2 weeks, he did feel a pop and nausea and likely an ATF rupture  Patient is a student at this time no specific restrictions other than weightbearing to tolerance in the fracture boot    We will monitor function of the peroneal tendons upon next visit to confirm no rupture is noted.  They are guarded today.    9/11/2019  Order to begin PT and may progress as tolerated.  Weightbearing as tolerated in the fracture boot, PT may remove the fracture boot and progress as tolerated  Letter for parking pass handicap up to 3 months  Progressed to regular sturdy stiff shoe gear if tolerated otherwise remain in the fracture boot until no pain or edema is noted in PT progresses  Follow-up in 3 weeks for reevaluation.    10/2/2019  Did not schedule any PT.    May progress out of the boot and start PT now and may consider ankle   Progress all activities as tolerated now  Follow up in one month if still pain otherwise as needed    May consider a compression ankle brace over the next few weeks until  able to perform unilateral toe raise and then discontinue the ankle brace.      Han Solorio DPM    Again, thank you for allowing me to participate in the care of your patient.        Sincerely,        Han Solorio DPM

## 2019-10-02 NOTE — PROGRESS NOTES
"Chief Complaint   Patient presents with     RECHECK     (6w5d) WB w/tall gray boot, intermittent swelling and pain 3, has not gone to PT yet - Right ankle sprain, DOI 8/17/19; LOV 9/11/2019       Weight management plan: Patient was referred to their PCP to discuss a diet and exercise plan.     HPI:  XR Left ankle 8/25/2019  Onset 8/17/2019, lateral Left foot and ankle, tripped on a rock and rolled Left ankle fell and felt a \"pop\". Presents today with WB w/tall gasca fx boot.  Constant, swelling, bruising, sharp, throbbing, burning, pain 3-8  Naproxen, ice, elevation, rest    Patient states that he is not been able to get into physical therapy in the last month.    Full time student for phychology     EXAM:Vitals: /86 (BP Location: Left arm, Cuff Size: Adult Large)   Ht 1.695 m (5' 6.75\")   Wt 115.7 kg (255 lb)   BMI 40.24 kg/m    BMI= Body mass index is 40.24 kg/m .    General appearance: Patient is alert and fully cooperative with history & exam.  No sign of distress is noted during the visit.     Psychiatric: Affect is pleasant & appropriate.  Patient appears motivated to improve health.     Respiratory: Breathing is regular & unlabored while sitting.     HEENT: Hearing is intact to spoken word.  Speech is clear.  No gross evidence of visual impairment that would impact ambulation.     Vascular: DP & PT pulses are intact & regular bilaterally.  No significant edema or varicosities noted.  CFT and skin temperature is normal to both lower extremities.     Neurologic: Lower extremity sensation is intact to light touch.  No evidence of weakness or contracture in the lower extremities.  No evidence of neuropathy.    Dermatologic: Skin is intact to both lower extremities with adequate texture, turgor and tone about the integument.  No paronychia or evidence of soft tissue infection is noted.     Musculoskeletal: Patient is ambulatory with fracture boot.  Minimal if any edema noted and it is very soft about the " lateral right ankle.  No peroneal subluxation.  He is able to perform unilateral toe raise on the right but not the left.    Radiographs are consistent with avulsion of the anterior lateral ankle.  No joint diastases.  No obvious change in the fifth metatarsal base on these ankle images.     ASSESSMENT:       ICD-10-CM    1. Sprain of anterior talofibular ligament of right ankle, initial encounter S93.491A         PLAN:  Reviewed patient's [facility] in [facility].      8/28/2019   Interpreted x-rays  Order for roll about at patient's request as he is having challenges with the crutches.    Can begin weightbearing as tolerated in the fracture boot  Fracture boot for all WB and during rest 24/7  Follow up in 2 weeks, he did feel a pop and nausea and likely an ATF rupture  Patient is a student at this time no specific restrictions other than weightbearing to tolerance in the fracture boot    We will monitor function of the peroneal tendons upon next visit to confirm no rupture is noted.  They are guarded today.    9/11/2019  Order to begin PT and may progress as tolerated.  Weightbearing as tolerated in the fracture boot, PT may remove the fracture boot and progress as tolerated  Letter for parking pass handicap up to 3 months  Progressed to regular sturdy stiff shoe gear if tolerated otherwise remain in the fracture boot until no pain or edema is noted in PT progresses  Follow-up in 3 weeks for reevaluation.    10/2/2019  Did not schedule any PT.    May progress out of the boot and start PT now and may consider ankle   Progress all activities as tolerated now  Follow up in one month if still pain otherwise as needed    May consider a compression ankle brace over the next few weeks until able to perform unilateral toe raise and then discontinue the ankle brace.      Han Solorio DPM

## 2019-10-02 NOTE — PATIENT INSTRUCTIONS
Tri Lock ankle brace is a reliable and sturdy ankle brace. A figure of 8 ankle brace, Procare double strap ankle support or lace up ankle gauntlet or similar brand are most readily available on line, Nymirum, or [employer]plant delivered for around $20-40.  Health insurance may not pay for these.

## 2019-10-03 NOTE — PROGRESS NOTES
10/02/19 1410   General Information   Type of Visit Initial OP Ortho PT Evaluation   Start of Care Date 10/02/19   Referring Physician Dr. Han Solorio   Patient/Family Goals Statement Get back to normal walking.   Orders Evaluate and Treat   Orders Comment L ankle grade 2 ATF lateral ankle sprain   Date of Order 10/02/19   Medical Diagnosis Sprain of anterior talofibular ligament of right ankle, intial encounter   Surgical/Medical history reviewed Yes   Precautions/Limitations no known precautions/limitations   Weight-Bearing Status - LUE full weight-bearing   Weight-Bearing Status - RUE full weight-bearing   Weight-Bearing Status - LLE weight-bearing as tolerated   Weight-Bearing Status - RLE full weight-bearing       Present No   Body Part(s)   Body Part(s) Ankle/Foot   Presentation and Etiology   Pertinent history of current problem (include personal factors and/or comorbidities that impact the POC) Pt was walking and stepped on a rock and heard a pop.  Pt was placed in a boot a week later.  Currently in the boot with compression sock.  Pt states that he has been in the boot since, just saw MD and is able to start weaning off boot.  Currently a college student.  Difficulties with stairs, walking, and daily activities.  Pt has a history of TBI and ankle injury at age 14.   Impairments A. Pain;C. Swelling   Functional Limitations perform activities of daily living   Symptom Location Lateral L ankle   How/Where did it occur With a fall   Onset date of current episode/exacerbation 08/28/19   Chronicity New   Pain rating (0-10 point scale) Best (/10);Worst (/10)   Best (/10) 0/10   Worst (/10) 7/10   Pain quality B. Dull;D. Burning   Frequency of pain/symptoms C. With activity   Pain/symptoms are: Worse during the day   Pain/symptoms exacerbated by B. Walking  (Stairs)   Pain/symptoms eased by J. Braces/supports   Progression of symptoms since onset: Improved   Current / Previous Interventions    Diagnostic Tests: X-ray   X-ray Results Results   X-ray results See chart   Prior Level of Function   Prior Level of Function-Mobility Independent   Prior Level of Function-ADLs Independent   Current Level of Function   Current Community Support Family/friend caregiver   Patient role/employment history B. Student   [facility]   Home/community accessibility Stairs at home   Current equipment-Gait/Locomotion None   Current equipment-ADL None   Fall Risk Screen   Fall screen completed by PT   Have you fallen 2 or more times in the past year? No   Have you fallen and had an injury in the past year? No   Is patient a fall risk? No   Functional Scales   Functional Scales Other   Other Scales  LEFS   Ankle/Foot Objective Findings   Side (if bilateral, select both right and left) Left;Right   Observation No acute distress   Integumentary Maleolus: L 28 cm, R R 27.5cm; Figure 8: L 58.4 cm, R 57cm   Gait/Locomotion Antalgic gait with decreased stride and step length on L.   Balance/ Proprioception (Single Leg Stance) Unable to stand SL on L   Foot Position In Standing Pes planus B   Kleiger ER Test (DF/Eversion Test) Negative   Windlass Test Negative   Longitudinal Arch Angle Test Negative   Anterior Drawer Test Negative   Posterior Drawer Test Negative   Talar Tilt Test Negative   Palpation Tenderness to L talus and anterior tibialis.   Accessory Motion/Joint Mobility Slight decrease in L talocrural joint mobility.   Left DF (Knee Ext) AROM 5 degrees   Left PF AROM 44 degrees   Left Calcanceal Inversion AROM 21 degrees   Left Calcaneal Eversion AROM 12 degrees   Left DF/Inversion Strength 4/5   Left DF/Eversion Strength 4/5   Left PF/Inversion Strength 4/5   Left PF/Eversion Strength 4/5   Left PF Strength Unable to perform SL heel raise   Left Gastroc (in WB) Flexibility Lacking 25% of motion   Left Soleus (in WB) Flexibility Lacking 25% of motion   Right DF (Knee Ext) AROM 14 degrees   Right PF  AROM 58 degrees   Right Calcanceal Inversion AROM 20 degrees   Right Calcaneal Eversion AROM 38 degrees   Right DF/Inversion Strength 5/5   Right DF/Eversion Strength 5/5   Right PF/Inversion Strength 5/5   Right PF/Eversion Strength 5/5   Right PF Strength 5/5   Right Gastroc (in WB) Flexibility Normal   Right Soleus (in WB) Flexibility Normal   Planned Therapy Interventions   Planned Therapy Interventions balance training;gait training;joint mobilization;manual therapy;neuromuscular re-education;ROM;strengthening;stretching   Planned Modality Interventions   Planned Modality Interventions Comments Modalities as needed for symptom relief.   Clinical Impression   Criteria for Skilled Therapeutic Interventions Met yes, treatment indicated   PT Diagnosis Decreased ROM, weakness, poor gait, and joint restrictions to the L ankle.   Influenced by the following impairments Pain, stiffness   Functional limitations due to impairments Walking, stairs   Clinical Presentation Stable/Uncomplicated   Clinical Presentation Rationale Pt is a 44 year old male who sustained a fracture/sprain to the L ankle.  Pt demonstrates lack of ROM, weakness, poor gait without boot, and joint mobility.  Pt has a history of TBI which may affect some memory of HEP.  Pt will benefit from skilled PT services to address overall strength, ROM, and mobility.   Clinical Decision Making (Complexity) Low complexity   Therapy Frequency 1 time/week   Predicted Duration of Therapy Intervention (days/wks) 4 weeks   Risk & Benefits of therapy have been explained Yes   Patient, Family & other staff in agreement with plan of care Yes   Education Assessment   Preferred Learning Style Listening;Demonstration;Pictures/video   Barriers to Learning No barriers   ORTHO GOALS   PT Ortho Eval Goals 1;2;3   Ortho Goal 1   Goal Identifier #1   Goal Description Pt will demonstrate full pain free AROM of the L ankle in order to progress to normal gait.   Target Date  11/01/19   Ortho Goal 2   Goal Identifier #2   Goal Description Pt will demonstrate ability to complete 10 SL heel raises on L side in order to demonstrate enough strength for gait and stairs.   Target Date 11/01/19   Ortho Goal 3   Goal Identifier #3   Goal Description Pt will report >75% on the LEFS demonstrating improved strength and functional mobility with less pain.   Target Date 11/16/19   Total Evaluation Time   PT Eval, Low Complexity Minutes (16780) 30     Thank you for your referral.    Jessica Covarrubias, PT, DPT  [facility]  [phone]

## 2019-10-09 ENCOUNTER — HOSPITAL ENCOUNTER (OUTPATIENT)
Dept: PHYSICAL THERAPY | Facility: CLINIC | Age: 44
Setting detail: THERAPIES SERIES
End: 2019-10-09
Attending: PODIATRIST
Payer: COMMERCIAL

## 2019-10-09 PROCEDURE — 97110 THERAPEUTIC EXERCISES: CPT | Mod: GP | Performed by: PHYSICAL THERAPIST

## 2019-10-09 PROCEDURE — 97112 NEUROMUSCULAR REEDUCATION: CPT | Mod: GP | Performed by: PHYSICAL THERAPIST

## 2019-10-14 ENCOUNTER — OFFICE VISIT (OUTPATIENT)
Dept: ORTHOPEDICS | Facility: OTHER | Age: 44
End: 2019-10-14
Payer: COMMERCIAL

## 2019-10-14 ENCOUNTER — HOSPITAL ENCOUNTER (EMERGENCY)
Facility: CLINIC | Age: 44
Discharge: HOME | End: 2019-10-14
Attending: EMERGENCY MEDICINE | Admitting: EMERGENCY MEDICINE
Payer: COMMERCIAL

## 2019-10-14 ENCOUNTER — APPOINTMENT (OUTPATIENT)
Dept: GENERAL RADIOLOGY | Facility: CLINIC | Age: 44
End: 2019-10-14
Attending: EMERGENCY MEDICINE
Payer: COMMERCIAL

## 2019-10-14 VITALS
RESPIRATION RATE: 18 BRPM | BODY MASS INDEX: 38.34 KG/M2 | WEIGHT: 253 LBS | HEIGHT: 68 IN | HEART RATE: 72 BPM | TEMPERATURE: 97.5 F | SYSTOLIC BLOOD PRESSURE: 139 MMHG | DIASTOLIC BLOOD PRESSURE: 98 MMHG | OXYGEN SATURATION: 95 %

## 2019-10-14 VITALS
HEIGHT: 67 IN | SYSTOLIC BLOOD PRESSURE: 124 MMHG | DIASTOLIC BLOOD PRESSURE: 90 MMHG | BODY MASS INDEX: 40.02 KG/M2 | WEIGHT: 255 LBS

## 2019-10-14 DIAGNOSIS — M25.561 ACUTE PAIN OF RIGHT KNEE: Primary | ICD-10-CM

## 2019-10-14 DIAGNOSIS — M25.561 ACUTE PAIN OF RIGHT KNEE: ICD-10-CM

## 2019-10-14 PROCEDURE — 73562 X-RAY EXAM OF KNEE 3: CPT | Mod: TC,RT

## 2019-10-14 PROCEDURE — 99283 EMERGENCY DEPT VISIT LOW MDM: CPT | Performed by: EMERGENCY MEDICINE

## 2019-10-14 PROCEDURE — 99204 OFFICE O/P NEW MOD 45 MIN: CPT | Performed by: PHYSICAL MEDICINE & REHABILITATION

## 2019-10-14 PROCEDURE — 99282 EMERGENCY DEPT VISIT SF MDM: CPT | Mod: Z6 | Performed by: EMERGENCY MEDICINE

## 2019-10-14 RX ORDER — MELOXICAM 15 MG/1
15 TABLET ORAL DAILY
Qty: 30 TABLET | Refills: 0 | Status: SHIPPED | OUTPATIENT
Start: 2019-10-14 | End: 2023-10-27

## 2019-10-14 RX ORDER — ACETAMINOPHEN 500 MG
500-1000 TABLET ORAL EVERY 6 HOURS PRN
COMMUNITY
End: 2023-10-27

## 2019-10-14 ASSESSMENT — MIFFLIN-ST. JEOR
SCORE: 2002.1
SCORE: 1991.33

## 2019-10-14 NOTE — ED TRIAGE NOTES
He has R knee pain that started hurting a week ago after wearing a walking boot on his L foot for 7 weeks.  He took the walking boot off 2 days ago. His knee is swollen.

## 2019-10-14 NOTE — PATIENT INSTRUCTIONS
Today's Plan of Care:  -Meloxicam prescribed. Please take this medication with food.  DO NOT take any other nonsteroidal anti-inflammatory drugs (NSAIDs) such as Advil, ibuprofen, Aleve, naproxen, etc while on this medication.  -May use crutches as needed  -Brace as needed for comfort and support  -Acetaminophen (Tylenol) up to 1000mg every 4-6 hours as needed (Maximum of 3000mg/day)  -Ice or ice massage 15-20 minutes for pain relief or swelling as needed      -We also discussed other future treatment options:  Steroid injection    Follow Up: As needed if symptoms fail to improve or worsen. Please call with any questions or concerns.
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
## 2019-10-14 NOTE — ED AVS SNAPSHOT
[facility] Emergency Department  [address] [place] [address]  Phone:  [phone]  Fax:  [phone]                                    Clint Alvarez   MRN: [id number]    Department:  [facility] Emergency Department   Date of Visit:  10/14/2019           After Visit Summary Signature Page    I have received my discharge instructions, and my questions have been answered. I have discussed any challenges I see with this plan with the nurse or doctor.    ..........................................................................................................................................  Patient/Patient Representative Signature      ..........................................................................................................................................  Patient Representative Print Name and Relationship to Patient    ..................................................               ................................................  Date                                   Time    ..........................................................................................................................................  Reviewed by Signature/Title    ...................................................              ..............................................  Date                                               Time          22EPIC Rev 08/18

## 2019-10-14 NOTE — ED PROVIDER NOTES
"  History     Chief Complaint   Patient presents with     Knee Pain     HPI  Clint Alvarez is a 44 year old male who presents with swelling and pain of the right knee.  This is been present for the last 3 to 4 days.  It is dull, achy and hurts to bear weight.  He has been on a walking boot on his left leg due to an ankle sprain and been favoring this.  This may be contributing to his pain he states.  There is been no specific incident of trauma or injury.    Allergies:  Allergies   Allergen Reactions     No Known Drug Allergy        Problem List:    Patient Active Problem List    Diagnosis Date Noted     Morbid obesity (H) 2019     Priority: Medium     Moderate recurrent major depression (H), with mixed features 2019     Priority: Medium     Anxiety, unspecified 2019     Priority: Medium     Dysthymia 10/26/2018     Priority: Medium        Past Medical History:    Past Medical History:   Diagnosis Date     DDD (degenerative disc disease)      Gallstone 2012     Kidney stone      Lyme disease        Past Surgical History:    Past Surgical History:   Procedure Laterality Date     INSERT CHEST TUBE             Family History:    No family history on file.    Social History:  Marital Status:   [4]  Social History     Tobacco Use     Smoking status: Former Smoker     Packs/day: 0.50     Years: 20.00     Pack years: 10.00     Last attempt to quit: 2018     Years since quittin.0     Smokeless tobacco: Never Used   Substance Use Topics     Alcohol use: No     Drug use: No        Medications:    acetaminophen (TYLENOL) 500 MG tablet  NAPROXEN PO  aspirin 81 MG EC tablet  niacin 100 MG tablet  order for DME  Vitamin D, Cholecalciferol, 1000 units TABS          Review of Systems  All other systems are reviewed and are negative    Physical Exam   BP: (!) 139/98  Pulse: 72  Temp: 97.5  F (36.4  C)  Resp: 18  Height: 172.7 cm (5' 8\")  Weight: 114.8 kg (253 lb)  SpO2: 95 " %      Physical Exam  Vitals signs reviewed.   Constitutional:       General: He is not in acute distress.     Appearance: He is not diaphoretic.   HENT:      Head: Normocephalic and atraumatic.   Eyes:      General: No scleral icterus.        Right eye: No discharge.         Left eye: No discharge.      Conjunctiva/sclera: Conjunctivae normal.   Neck:      Musculoskeletal: Normal range of motion.   Pulmonary:      Effort: Pulmonary effort is normal.      Breath sounds: No stridor.   Musculoskeletal: Normal range of motion.      Comments: The right knee reveals mild effusion.  There is no erythema or warmth.  There is no external ecchymosis.  There is moderate tenderness at the lateral joint line region.  There is a moderate decrease in range of motion due to pain.  Distal CMS is intact to the foot.   Skin:     General: Skin is warm and dry.      Findings: No rash.   Neurological:      Mental Status: He is alert.      Comments: Normal speech and mentation   Psychiatric:         Judgement: Judgment normal.         ED Course        Procedures                Critical Care time:  none               Results for orders placed or performed during the hospital encounter of 10/14/19 (from the past 24 hour(s))   XR Knee Right 3 Views    Narrative    RIGHT KNEE THREE VIEWS  10/14/2019 8:51 AM     HISTORY:  Pain and swelling.      Impression    IMPRESSION: Joint effusion. Otherwise unremarkable.    KRYS MCADAMS MD       Medications - No data to display    Assessments & Plan (with Medical Decision Making)  44-year-old male who presents with some subacute right knee pain.  No trauma.  No distinct injury.  Effusion noted.  X-rays as above without bony abnormality.  Have recommended some rest, ice and elevation.  This may be exacerbated due to recent boot on his left foot and favoring his leg.  Referred to sports medicine if not improving.     I have reviewed the nursing notes.    I have reviewed the findings, diagnosis, plan and  need for follow up with the patient.       Discharge Medication List as of 10/14/2019  9:41 AM          Final diagnoses:   Acute pain of right knee       10/14/2019   [facility] EMERGENCY DEPARTMENT     Ramsey Cabrera MD  10/14/19 [id number]
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
## 2019-10-16 ENCOUNTER — HOSPITAL ENCOUNTER (OUTPATIENT)
Dept: PHYSICAL THERAPY | Facility: CLINIC | Age: 44
Setting detail: THERAPIES SERIES
End: 2019-10-16
Attending: PODIATRIST
Payer: COMMERCIAL

## 2019-10-16 PROCEDURE — 97110 THERAPEUTIC EXERCISES: CPT | Mod: GP | Performed by: PHYSICAL THERAPIST

## 2019-10-23 ENCOUNTER — HOSPITAL ENCOUNTER (OUTPATIENT)
Dept: PHYSICAL THERAPY | Facility: CLINIC | Age: 44
Setting detail: THERAPIES SERIES
End: 2019-10-23
Attending: PODIATRIST
Payer: COMMERCIAL

## 2019-10-23 PROCEDURE — 97112 NEUROMUSCULAR REEDUCATION: CPT | Mod: GP | Performed by: PHYSICAL THERAPIST

## 2019-10-30 ENCOUNTER — HOSPITAL ENCOUNTER (OUTPATIENT)
Dept: PHYSICAL THERAPY | Facility: CLINIC | Age: 44
Setting detail: THERAPIES SERIES
End: 2019-10-30
Attending: PODIATRIST
Payer: COMMERCIAL

## 2019-10-30 PROCEDURE — 97110 THERAPEUTIC EXERCISES: CPT | Mod: GP | Performed by: PHYSICAL THERAPIST

## 2019-10-30 NOTE — PROGRESS NOTES
Outpatient Physical Therapy Discharge Note     Patient: Clint Alvarez  : 1975    Beginning/End Dates of Reporting Period:  10/2/2019 to 10/30/2019    Referring Provider: Dr. Han Solorio    Therapy Diagnosis: Decreased ROM, weakness, poor gait, and joint restrictions to the L ankle.     Client Self Report: Pt states that he twisted his R knee today at school.  Having no issues in the L ankle, not wearing brace, and having no pain.  Pt states his biggest complaint is R knee pain.    Objective Measurements:  Objective Measure: LEFS  Details: 60/80  Objective Measure: ROM  Details: AROM of the L ankle: eversion 35 degrees, inversion 55 degrees, PF 64 degrees, DF 10 degrees  Objective Measure: Stability  Details: 35 secs SL stance on L; 10 heel raises on L, fatigues.  Objective Measure: Special testing  Details: Positive R Thessely's for medial meniscus    Goals:  Goal Identifier #1   Goal Description Pt will demonstrate full pain free AROM of the L ankle in order to progress to normal gait.   Target Date 19   Date Met  10/30/19   Progress:     Goal Identifier #2   Goal Description Pt will demonstrate ability to complete 10 SL heel raises on L side in order to demonstrate enough strength for gait and stairs.   Target Date 19   Date Met  10/30/19(10 SL fatigues, 35 secs SL stance)   Progress:     Goal Identifier #3   Goal Description Pt will report >75% on the LEFS demonstrating improved strength and functional mobility with less pain.   Target Date 19   Date Met  10/30/19   Progress:     Progress Toward Goals:   Pt was seen for a total of 5 visits.  Demonstrates great improvements with normal L ankle AROM, normal gait, and improved stability.  Does struggle slightly with plantarflexion strength on L side however with continued HEP progression pt will improve independently. No further need for skilled PT services for L ankle.  PT is recommending pt return to PCP to address R knee  pain.    Plan:  Discharge from therapy.    Discharge:    Reason for Discharge: Patient has met all goals.    Equipment Issued: Theraband    Discharge Plan: Patient to continue home program.    Thank you for your referral.    Jessica Covarrubias PT, DPT  [facility]  [phone]

## 2019-10-31 DIAGNOSIS — M25.561 ACUTE PAIN OF RIGHT KNEE: Primary | ICD-10-CM

## 2019-11-07 ENCOUNTER — TELEPHONE (OUTPATIENT)
Dept: ORTHOPEDICS | Facility: CLINIC | Age: 44
End: 2019-11-07

## 2019-11-07 DIAGNOSIS — M25.561 ACUTE PAIN OF RIGHT KNEE: Primary | ICD-10-CM

## 2019-11-07 NOTE — TELEPHONE ENCOUNTER
MRI ordered.  Please have him follow up in clinic after completion of MRI in clinic. Please schedule at least 1-2 business days after MRI is completed to ensure we have the results of the MRI.

## 2019-11-07 NOTE — TELEPHONE ENCOUNTER
Reason for Call: Request for an order or referral:    Order or referral being requested: CT or MRI of right knee    Date needed: as soon as possible    Has the patient been seen by the PCP for this problem? YES    Additional comments: seen 10/14/19 by Dr Acuna for right knee pain.  Is still having pain, clicks, locks, feels it is getting worse.  Does not feel comfortable moving forward with physical therapy until further investigation is done, would like CT or MRI.  Wants soft tissue to be looked at. Please advise.     Phone number Patient can be reached at:  Cell number on file:    Telephone Information:   Mobile [phone]       Best Time:  Any time    Can we leave a detailed message on this number?  YES    Call taken on 11/7/2019 at 3:30 PM by Lolis Lackey

## 2019-11-07 NOTE — TELEPHONE ENCOUNTER
LVM for patient with imaging number and instructed to f/u in clinic 1-2 days after the MRI is completed.     Sun España M.Ed., LAT, ATC  Clinic Coordinator for Dr. Ebony Acuna

## 2019-11-12 ENCOUNTER — TELEPHONE (OUTPATIENT)
Dept: FAMILY MEDICINE | Facility: OTHER | Age: 44
End: 2019-11-12

## 2019-11-12 NOTE — TELEPHONE ENCOUNTER
Panel Management Review      Patient has the following on his problem list:       Composite cancer screening  Chart review shows that this patient is due/due soon for the following   Summary:    Patient is due/failing the following:   BP CHECK    Action needed:   Patient needs nurse only appointment.    Type of outreach:    Sent DocuTAP message.    Questions for provider review:    None                                                                                                                                    Cally BOWENS LPN       Chart routed to Cally BOWENS LPN   .

## 2019-11-14 ENCOUNTER — ALLIED HEALTH/NURSE VISIT (OUTPATIENT)
Dept: FAMILY MEDICINE | Facility: OTHER | Age: 44
End: 2019-11-14
Payer: COMMERCIAL

## 2019-11-14 VITALS — DIASTOLIC BLOOD PRESSURE: 88 MMHG | HEART RATE: 72 BPM | SYSTOLIC BLOOD PRESSURE: 130 MMHG

## 2019-11-14 DIAGNOSIS — Z01.30 BLOOD PRESSURE CHECK: Primary | ICD-10-CM

## 2019-11-14 PROCEDURE — 99207 ZZC NO CHARGE NURSE ONLY: CPT

## 2019-11-14 NOTE — PROGRESS NOTES
Clint Alvarez is a 44 year old patient who comes in today for a Blood Pressure check.  Initial BP:  /88 (BP Location: Right arm, Patient Position: Sitting, Cuff Size: Adult Large)   Pulse 72      72  Disposition: follow-up as previously indicated by provider and results routed to provider.     Patient denies any symptoms of chest pain, Chest tightness, arm pain or SOB.     Rufina Rasmussen MA

## 2019-12-09 ENCOUNTER — HEALTH MAINTENANCE LETTER (OUTPATIENT)
Age: 44
End: 2019-12-09

## 2019-12-18 ENCOUNTER — TELEPHONE (OUTPATIENT)
Dept: FAMILY MEDICINE | Facility: OTHER | Age: 44
End: 2019-12-18

## 2019-12-18 NOTE — TELEPHONE ENCOUNTER
Patient is due for a PHQ-9.  Index start date:8/27/2019  Index end date:12/25/2019    Please call patient. Pt is active on [employer]. I have sent a PHQ-9 via One[employer] and will postpone the encounter. Julia Elam CMA ([facility])

## 2019-12-30 NOTE — TELEPHONE ENCOUNTER
Pt didn't return phone calls. PHQ-9 missed. I will close the encounter until further outreach. Julia Elam CMA ([facility])

## 2020-02-11 ENCOUNTER — OFFICE VISIT (OUTPATIENT)
Dept: FAMILY MEDICINE | Facility: OTHER | Age: 45
End: 2020-02-11
Payer: COMMERCIAL

## 2020-02-11 VITALS
HEIGHT: 67 IN | TEMPERATURE: 98.1 F | RESPIRATION RATE: 16 BRPM | OXYGEN SATURATION: 97 % | WEIGHT: 251.6 LBS | BODY MASS INDEX: 39.49 KG/M2 | HEART RATE: 72 BPM | DIASTOLIC BLOOD PRESSURE: 80 MMHG | SYSTOLIC BLOOD PRESSURE: 124 MMHG

## 2020-02-11 DIAGNOSIS — R30.0 DYSURIA: Primary | ICD-10-CM

## 2020-02-11 LAB
ALBUMIN UR-MCNC: NEGATIVE MG/DL
APPEARANCE UR: CLEAR
BILIRUB UR QL STRIP: NEGATIVE
COLOR UR AUTO: YELLOW
GLUCOSE UR STRIP-MCNC: NEGATIVE MG/DL
HGB UR QL STRIP: NEGATIVE
KETONES UR STRIP-MCNC: NEGATIVE MG/DL
LEUKOCYTE ESTERASE UR QL STRIP: NEGATIVE
NITRATE UR QL: NEGATIVE
PH UR STRIP: 6 PH (ref 5–7)
SOURCE: NORMAL
SP GR UR STRIP: >1.03 (ref 1–1.03)
UROBILINOGEN UR STRIP-ACNC: 0.2 EU/DL (ref 0.2–1)

## 2020-02-11 PROCEDURE — 99213 OFFICE O/P EST LOW 20 MIN: CPT | Performed by: PHYSICIAN ASSISTANT

## 2020-02-11 PROCEDURE — 81003 URINALYSIS AUTO W/O SCOPE: CPT | Performed by: PHYSICIAN ASSISTANT

## 2020-02-11 ASSESSMENT — PAIN SCALES - GENERAL: PAINLEVEL: NO PAIN (0)

## 2020-02-11 ASSESSMENT — PATIENT HEALTH QUESTIONNAIRE - PHQ9: SUM OF ALL RESPONSES TO PHQ QUESTIONS 1-9: 5

## 2020-02-11 ASSESSMENT — MIFFLIN-ST. JEOR: SCORE: 1971.94

## 2020-02-11 NOTE — PATIENT INSTRUCTIONS
Patient Education     Dehydration (Adult)  Dehydration occurs when your body loses too much fluid. This may be the result of prolonged vomiting or diarrhea, excessive sweating, or a high fever. It may also happen if you don t drink enough fluid when you re sick or out in the heat. Misuse of diuretics (water pills) can also be a cause.  Symptoms include thirst and decreased urine output. You may also feel dizzy, weak, fatigued, or very drowsy. The diet described below is usually enough to treat dehydration. In some cases, you may need medicine.  Home care    Drink at least 12 8-ounce glasses of fluid every day to resolve the dehydration. Fluid may include water; orange juice; lemonade; apple, grape, or cranberry juice; clear fruit drinks; electrolyte replacement and sports drinks; and teas and coffee without caffeine. Don't drink alcohol. If you have been diagnosed with a kidney disease, ask your doctor how much and what types of fluids you should drink to prevent dehydration. If you have kidney disease, fluid can build up in the body. This can be dangerous to your health.    If you have a fever, muscle aches, or a headache as a result of a cold or flu, you may take acetaminophen or ibuprofen, unless another medicine was prescribed. If you have chronic liver or kidney disease, or have ever had a stomach ulcer or gastrointestinal bleeding, talk with your healthcare provider before using these medicines. Don't take aspirin if you are younger than 18 and have a fever. Aspirin raises the chance for severe liver injury.  Follow-up care  Follow up with your healthcare provider, or as advised.  When to seek medical advice  Call your healthcare provider right away if any of these occur:    Continued vomiting    Frequent diarrhea (more than 5 times a day); blood (red or black color) or mucus in diarrhea    Blood in vomit or stool    Swollen abdomen or increasing abdominal pain    Weakness, dizziness, or fainting    Unusual  drowsiness or confusion    Reduced urine output or extreme thirst    Fever of 100.4 F (38 C) or higher  Date Last Reviewed: 5/1/2017 2000-2019 [employer]. [address] [place] [address], [place] [address]. All rights reserved. This information is not intended as a substitute for professional medical care. Always follow your healthcare professional's instructions.

## 2020-02-11 NOTE — NURSING NOTE
Health Maintenance Due   Topic Date Due     HIV SCREENING  07/07/1988     PNEUMOCOCCAL IMMUNIZATION 19-64 MEDIUM RISK (1 of 1 - PPSV23) 07/07/1992     PREVENTIVE CARE VISIT  10/26/2019     PHQ-9  12/10/2019     DTAP/TDAP/TD IMMUNIZATION (2 - Td) 12/10/2019     Cally BOWENS LPN

## 2020-02-11 NOTE — PROGRESS NOTES
"Subjective     Clint Alvarez is a 44 year old male who presents to clinic today for the following health issues:    HPI   Genitourinary - Male  Onset: 1 week    Description:   Dysuria (painful urination): YES  Hematuria (blood in urine): YES  Frequency: YES  Are you urinating at night : no   Hesitancy (delay in urine): no   Retention (unable to empty): no   Decrease in urinary flow: no   Incontinence: no     Progression of Symptoms:  improving    Accompanying Signs & Symptoms:  Fever: no   Back/Flank pain: no   Urethral discharge: no   Testicle lumps/masses/pain: no   Nausea and/or vomiting: no   Abdominal pain: no     History:   History of frequent UTI's: No  History of kidney stones: YES  History of hernias: no   Personal or Family history of Prostate problems: no  Sexually active: no     Precipitating factors:   nothing    Alleviating factors:  Increased fluids      Patient is a 44 year old male who presents today with his significant other to discuss concerns about UTI following burning sensation and saw a small blood clot while urinating 1 week prior. Patient has been enrolled in post secondary education courses and notes that he has been drinking quite a bit of coffee to help him stay awake during classes and with studying, but had consumed little to no water. He said that after the episode of burning/blood clot he has reduced his coffee intake and increased his water. The symptoms have all but resolved.       Reviewed and updated as needed this visit by Provider         Review of Systems   ROS COMP: Constitutional, HEENT, cardiovascular, pulmonary, gi and gu systems are negative, except as otherwise noted.      Objective    /80 (BP Location: Right arm, Patient Position: Chair, Cuff Size: Adult Large)   Pulse 72   Temp 98.1  F (36.7  C) (Oral)   Resp 16   Ht 1.689 m (5' 6.5\")   Wt 114.1 kg (251 lb 9.6 oz)   SpO2 97%   BMI 40.00 kg/m    Body mass index is 40 kg/m .  Physical Exam   GENERAL: " healthy, alert and no distress  EYES: Eyes grossly normal to inspection, PERRL and conjunctivae and sclerae normal  HENT: ear canals and TM's normal, nose and mouth without ulcers or lesions  RESP: lungs clear to auscultation - no rales, rhonchi or wheezes  CV: regular rate and rhythm, normal S1 S2, no S3 or S4, no murmur, click or rub, no peripheral edema and peripheral pulses strong  ABDOMEN: soft, nontender, no hepatosplenomegaly, no masses and bowel sounds normal  MS: no gross musculoskeletal defects noted, no edema  NEURO: Normal strength and tone, mentation intact and speech normal  PSYCH: mentation appears normal, affect normal/bright    Diagnostic Test Results:  Results for orders placed or performed in visit on 02/11/20 (from the past 24 hour(s))   *UA reflex to Microscopic and Culture ([facility] (except [place] and [place])   Result Value Ref Range    Color Urine Yellow     Appearance Urine Clear     Glucose Urine Negative NEG^Negative mg/dL    Bilirubin Urine Negative NEG^Negative    Ketones Urine Negative NEG^Negative mg/dL    Specific Gravity Urine >1.030 1.003 - 1.035    Blood Urine Negative NEG^Negative    pH Urine 6.0 5.0 - 7.0 pH    Protein Albumin Urine Negative NEG^Negative mg/dL    Urobilinogen Urine 0.2 0.2 - 1.0 EU/dL    Nitrite Urine Negative NEG^Negative    Leukocyte Esterase Urine Negative NEG^Negative    Source Midstream Urine            Assessment & Plan     1. Dysuria  Encouraged patient to stay well hydrated and maintain a water intake of 8-12 glasses of water per day. He will monitor for recurrence of symptoms and call clinic if these occur.   - *UA reflex to Microscopic and Culture ([facility] (except [place] and [place])    Return in about 6 months (around 8/11/2020) for Return for scheduled annual checkup with PCP.    Israel Matthews PA-C  [facility]

## 2020-07-09 ENCOUNTER — APPOINTMENT (OUTPATIENT)
Dept: GENERAL RADIOLOGY | Facility: CLINIC | Age: 45
End: 2020-07-09
Attending: NURSE PRACTITIONER
Payer: COMMERCIAL

## 2020-07-09 ENCOUNTER — HOSPITAL ENCOUNTER (EMERGENCY)
Facility: CLINIC | Age: 45
Discharge: HOME | End: 2020-07-09
Attending: NURSE PRACTITIONER | Admitting: NURSE PRACTITIONER
Payer: COMMERCIAL

## 2020-07-09 ENCOUNTER — NURSE TRIAGE (OUTPATIENT)
Dept: NURSING | Facility: CLINIC | Age: 45
End: 2020-07-09

## 2020-07-09 VITALS
HEART RATE: 74 BPM | SYSTOLIC BLOOD PRESSURE: 143 MMHG | TEMPERATURE: 98.7 F | OXYGEN SATURATION: 96 % | DIASTOLIC BLOOD PRESSURE: 95 MMHG | RESPIRATION RATE: 18 BRPM

## 2020-07-09 DIAGNOSIS — M25.571 ACUTE RIGHT ANKLE PAIN: ICD-10-CM

## 2020-07-09 LAB
CRP SERPL-MCNC: 20.2 MG/L (ref 0–8)
ERYTHROCYTE [SEDIMENTATION RATE] IN BLOOD BY WESTERGREN METHOD: 11 MM/H (ref 0–15)
URATE SERPL-MCNC: 8.2 MG/DL (ref 3.5–7.2)

## 2020-07-09 PROCEDURE — 73610 X-RAY EXAM OF ANKLE: CPT | Mod: TC,RT

## 2020-07-09 PROCEDURE — 99284 EMERGENCY DEPT VISIT MOD MDM: CPT | Performed by: NURSE PRACTITIONER

## 2020-07-09 PROCEDURE — 86140 C-REACTIVE PROTEIN: CPT | Performed by: NURSE PRACTITIONER

## 2020-07-09 PROCEDURE — 85652 RBC SED RATE AUTOMATED: CPT | Performed by: NURSE PRACTITIONER

## 2020-07-09 PROCEDURE — 25000132 ZZH RX MED GY IP 250 OP 250 PS 637: Performed by: NURSE PRACTITIONER

## 2020-07-09 PROCEDURE — 84550 ASSAY OF BLOOD/URIC ACID: CPT | Performed by: NURSE PRACTITIONER

## 2020-07-09 PROCEDURE — 99284 EMERGENCY DEPT VISIT MOD MDM: CPT | Mod: Z6 | Performed by: NURSE PRACTITIONER

## 2020-07-09 RX ORDER — HYDROCODONE BITARTRATE AND ACETAMINOPHEN 5; 325 MG/1; MG/1
1 TABLET ORAL EVERY 6 HOURS PRN
Qty: 15 TABLET | Refills: 0 | Status: SHIPPED | OUTPATIENT
Start: 2020-07-09 | End: 2020-07-12

## 2020-07-09 RX ORDER — PREDNISONE 10 MG/1
TABLET ORAL
Qty: 32 TABLET | Refills: 0 | Status: SHIPPED | OUTPATIENT
Start: 2020-07-09 | End: 2020-07-19

## 2020-07-09 RX ORDER — IBUPROFEN 600 MG/1
600 TABLET, FILM COATED ORAL ONCE
Status: COMPLETED | OUTPATIENT
Start: 2020-07-09 | End: 2020-07-09

## 2020-07-09 RX ADMIN — IBUPROFEN 600 MG: 600 TABLET ORAL at 12:18

## 2020-07-09 NOTE — DISCHARGE INSTRUCTIONS
Continue naproxen or ibuprofen per bottle instructions for pain, this can be alternated with Tylenol 650 mg every 4 hours as needed I have prescribed you Norco which is a narcotic for severe pain, do not drive or work or drink alcohol if you take this  This does have Tylenol in it do not exceed more than 4000 mg of Tylenol in a 24-hour period, start your prednisone this afternoon and take as directed with next dose being in the morning.

## 2020-07-09 NOTE — ED AVS SNAPSHOT
[facility] Emergency Department  [address] [place] [address]  Phone:  [phone]  Fax:  [phone]                                    Clint Alvarez   MRN: [id number]    Department:  [facility] Emergency Department   Date of Visit:  7/9/2020           After Visit Summary Signature Page    I have received my discharge instructions, and my questions have been answered. I have discussed any challenges I see with this plan with the nurse or doctor.    ..........................................................................................................................................  Patient/Patient Representative Signature      ..........................................................................................................................................  Patient Representative Print Name and Relationship to Patient    ..................................................               ................................................  Date                                   Time    ..........................................................................................................................................  Reviewed by Signature/Title    ...................................................              ..............................................  Date                                               Time          22EPIC Rev 08/18

## 2020-07-09 NOTE — TELEPHONE ENCOUNTER
His right ankle is swollen and hurts. He also is having knee pain. He will go to the [facility] for evaluation. He said it's bad, he doesn't think he can work this weekend because of it. It's on going for two weeks now.  Elda Tapia RN  [facility]      Additional Information    Negative: Chest pain    Negative: Followed an ankle injury    Negative: Ankle pain is main symptom    Negative: Swelling of both ankles (i.e., pedal edema)    Negative: Swelling of calf or leg    Negative: Difficulty breathing    Negative: Entire foot is cool or blue in comparison to other side    Negative: Fever    Negative: Patient sounds very sick or weak to the triager    [1] SEVERE pain (e.g., excruciating, unable to walk) AND [2] not improved after 2 hours of pain medicine     Pain at 8    Protocols used: ANKLE SWELLING-A-AH

## 2020-07-09 NOTE — ED PROVIDER NOTES
History     Chief Complaint   Patient presents with     Ankle Pain     HPI  Clint Alvarez is a 45 year old male who presents to the emergency room today with a two-week history of right ankle pain, worse with extension and ambulation, somewhat improved with Aleve and Tylenol.  Patient denies any injury, does have a history of gout but ankle is not red or warm.  Pain is a constant ache in etiology, bilateral ankle region.  Patient does have swelling to lateral portion of right ankle as well.    Allergies:  Allergies   Allergen Reactions     No Known Drug Allergy        Problem List:    Patient Active Problem List    Diagnosis Date Noted     Morbid obesity (H) 2019     Priority: Medium     Moderate recurrent major depression (H), with mixed features 2019     Priority: Medium     Anxiety, unspecified 2019     Priority: Medium     Dysthymia 10/26/2018     Priority: Medium        Past Medical History:    Past Medical History:   Diagnosis Date     DDD (degenerative disc disease)      Gallstone 2012     Kidney stone      Lyme disease        Past Surgical History:    Past Surgical History:   Procedure Laterality Date     INSERT CHEST TUBE             Family History:    No family history on file.    Social History:  Marital Status:   [4]  Social History     Tobacco Use     Smoking status: Former Smoker     Packs/day: 0.50     Years: 20.00     Pack years: 10.00     Last attempt to quit: 2018     Years since quittin.8     Smokeless tobacco: Never Used   Substance Use Topics     Alcohol use: No     Drug use: No        Medications:    acetaminophen (TYLENOL) 500 MG tablet  aspirin 81 MG EC tablet  meloxicam (MOBIC) 15 MG tablet  NAPROXEN PO  niacin 100 MG tablet  order for DME  order for DME  Vitamin D, Cholecalciferol, 1000 units TABS          Review of Systems   All other systems reviewed and are negative.      Physical Exam   BP: (!) 143/95  Pulse: 74  Temp: 98.7  F (37.1   C)  Resp: 18  SpO2: 96 %      Physical Exam  Constitutional:       Appearance: Normal appearance.   HENT:      Head: Normocephalic.      Mouth/Throat:      Mouth: Mucous membranes are moist.   Eyes:      Extraocular Movements: Extraocular movements intact.   Neck:      Musculoskeletal: Normal range of motion.   Cardiovascular:      Rate and Rhythm: Normal rate.   Pulmonary:      Effort: Pulmonary effort is normal.   Musculoskeletal:         General: Swelling (Right ankle, significant swelling to lateral malleoli region, tender to palpation to both lateral malleoli are and medial malleoli region, distal pulses cap refill and strength intact) and tenderness present.   Skin:     General: Skin is warm.      Capillary Refill: Capillary refill takes less than 2 seconds.   Neurological:      General: No focal deficit present.      Mental Status: He is alert.   Psychiatric:         Mood and Affect: Mood normal.         ED Course        Procedures      Results for orders placed or performed during the hospital encounter of 07/09/20 (from the past 24 hour(s))   Erythrocyte sedimentation rate auto   Result Value Ref Range    Sed Rate 11 0 - 15 mm/h   CRP inflammation   Result Value Ref Range    CRP Inflammation 20.2 (H) 0.0 - 8.0 mg/L   Uric acid   Result Value Ref Range    Uric Acid 8.2 (H) 3.5 - 7.2 mg/dL   Ankle XR, G/E 3 views, right    Narrative    RIGHT ANKLE THREE OR MORE VIEWS  7/9/2020 12:41 PM     HISTORY: Pain, swelling.    COMPARISON: None.      Impression    IMPRESSION: No bony abnormality. Mild soft tissue swelling about the  ankle.       Medications   ibuprofen (ADVIL/MOTRIN) tablet 600 mg (600 mg Oral Given 7/9/20 1218)       Assessments & Plan (with Medical Decision Making)  Right ankle pain, no history of significant trauma, imaging here is negative for fracture.  Certainly this could be arthritic in etiology patient does have an elevated CRP, his uric acid is just above the level of normal at 8.2 however  there is no redness or warmth that would indicate an acute gout type process.  Given the duration of symptoms and the amount of swelling I am going to place patient in an Aircast and Ace wrap with full weightbearing.  Patient can continue with his naproxen and Tylenol at home and I did give him Norco for severe pain, narcotic safety and side effects were discussed as well as maximum Tylenol dosing in a 24-hour period  I will start patient on a prednisone taper which hopefully will help his inflammation.  If his symptoms persist I would like him to follow-up with primary care in the next week.  Patient is agreeable to plan of care and discharged in stable condition.     I have reviewed the nursing notes.    I have reviewed the findings, diagnosis, plan and need for follow up with the patient.    New Prescriptions    HYDROCODONE-ACETAMINOPHEN (NORCO) 5-325 MG TABLET    Take 1 tablet by mouth every 6 hours as needed for pain or moderate to severe pain    PREDNISONE (DELTASONE) 10 MG TABLET    Take 4 tablets daily for 5 days,  take 2 tablets daily for 3 days, take 1 tablet daily for 3 days, take half a tablet for 3 days.       Final diagnoses:   Acute right ankle pain       7/9/2020   [facility] EMERGENCY DEPARTMENT     Radha Cabrera, CAITLIN CNP  07/09/20 1323

## 2020-07-09 NOTE — ED TRIAGE NOTES
Presents to ED with two weeks of right ankle swelling. Patient has hx of gout. Patient is weight bearing but it is painful.

## 2020-08-20 ENCOUNTER — MYC MEDICAL ADVICE (OUTPATIENT)
Dept: FAMILY MEDICINE | Facility: OTHER | Age: 45
End: 2020-08-20

## 2020-08-21 NOTE — TELEPHONE ENCOUNTER
No answer at home number, unidentified voicemail.   [employer] message sent.    Denice Brennan RN on 8/21/2020 at 8:43 AM

## 2020-08-25 NOTE — TELEPHONE ENCOUNTER
Attempted to call patient, no answer. Unable to leave message.     Philomena Gonzalez, REHANAN, RN  [facility]

## 2020-08-26 NOTE — TELEPHONE ENCOUNTER
Patient has an appointment tomorrow, 8/27/2020.  Closing this encounter.  REHANA McdonaldN, RN

## 2020-08-27 ENCOUNTER — OFFICE VISIT (OUTPATIENT)
Dept: FAMILY MEDICINE | Facility: CLINIC | Age: 45
End: 2020-08-27
Payer: COMMERCIAL

## 2020-08-27 VITALS
WEIGHT: 252 LBS | RESPIRATION RATE: 16 BRPM | SYSTOLIC BLOOD PRESSURE: 132 MMHG | BODY MASS INDEX: 40.06 KG/M2 | TEMPERATURE: 97.1 F | HEART RATE: 72 BPM | DIASTOLIC BLOOD PRESSURE: 88 MMHG | OXYGEN SATURATION: 95 %

## 2020-08-27 DIAGNOSIS — R63.5 WEIGHT GAIN: ICD-10-CM

## 2020-08-27 DIAGNOSIS — L20.9 ATOPIC DERMATITIS, UNSPECIFIED TYPE: ICD-10-CM

## 2020-08-27 DIAGNOSIS — M25.50 MULTIPLE JOINT PAIN: Primary | ICD-10-CM

## 2020-08-27 LAB
ALBUMIN SERPL-MCNC: 3.7 G/DL (ref 3.4–5)
ALP SERPL-CCNC: 105 U/L (ref 40–150)
ALT SERPL W P-5'-P-CCNC: 47 U/L (ref 0–70)
ANION GAP SERPL CALCULATED.3IONS-SCNC: 7 MMOL/L (ref 3–14)
AST SERPL W P-5'-P-CCNC: 16 U/L (ref 0–45)
BASOPHILS # BLD AUTO: 0.1 10E9/L (ref 0–0.2)
BASOPHILS NFR BLD AUTO: 1.3 %
BILIRUB SERPL-MCNC: 0.5 MG/DL (ref 0.2–1.3)
BUN SERPL-MCNC: 13 MG/DL (ref 7–30)
CALCIUM SERPL-MCNC: 9.1 MG/DL (ref 8.5–10.1)
CHLORIDE SERPL-SCNC: 109 MMOL/L (ref 94–109)
CO2 SERPL-SCNC: 24 MMOL/L (ref 20–32)
CREAT SERPL-MCNC: 0.99 MG/DL (ref 0.66–1.25)
CRP SERPL-MCNC: 14.8 MG/L (ref 0–8)
DIFFERENTIAL METHOD BLD: NORMAL
EOSINOPHIL NFR BLD AUTO: 1.5 %
ERYTHROCYTE [DISTWIDTH] IN BLOOD BY AUTOMATED COUNT: 12 % (ref 10–15)
ERYTHROCYTE [SEDIMENTATION RATE] IN BLOOD BY WESTERGREN METHOD: 11 MM/H (ref 0–15)
GFR SERPL CREATININE-BSD FRML MDRD: 90 ML/MIN/{1.73_M2}
GLUCOSE SERPL-MCNC: 95 MG/DL (ref 70–99)
HCT VFR BLD AUTO: 45.5 % (ref 40–53)
HGB BLD-MCNC: 15.7 G/DL (ref 13.3–17.7)
IMM GRANULOCYTES # BLD: 0 10E9/L (ref 0–0.4)
IMM GRANULOCYTES NFR BLD: 0.2 %
LYMPHOCYTES # BLD AUTO: 3.2 10E9/L (ref 0.8–5.3)
LYMPHOCYTES NFR BLD AUTO: 36.1 %
MCH RBC QN AUTO: 28.7 PG (ref 26.5–33)
MCHC RBC AUTO-ENTMCNC: 34.5 G/DL (ref 31.5–36.5)
MCV RBC AUTO: 83 FL (ref 78–100)
MONOCYTES # BLD AUTO: 0.8 10E9/L (ref 0–1.3)
MONOCYTES NFR BLD AUTO: 9.1 %
NEUTROPHILS # BLD AUTO: 4.6 10E9/L (ref 1.6–8.3)
NEUTROPHILS NFR BLD AUTO: 51.8 %
NRBC # BLD AUTO: 0 10*3/UL
NRBC BLD AUTO-RTO: 0 /100
PLATELET # BLD AUTO: 329 10E9/L (ref 150–450)
POTASSIUM SERPL-SCNC: 4.3 MMOL/L (ref 3.4–5.3)
PROT SERPL-MCNC: 7.8 G/DL (ref 6.8–8.8)
RBC # BLD AUTO: 5.47 10E12/L (ref 4.4–5.9)
SODIUM SERPL-SCNC: 140 MMOL/L (ref 133–144)
TSH SERPL DL<=0.005 MIU/L-ACNC: 2.26 MU/L (ref 0.4–4)
WBC # BLD AUTO: 8.9 10E9/L (ref 4–11)

## 2020-08-27 PROCEDURE — 85652 RBC SED RATE AUTOMATED: CPT | Performed by: PHYSICIAN ASSISTANT

## 2020-08-27 PROCEDURE — 86140 C-REACTIVE PROTEIN: CPT | Performed by: PHYSICIAN ASSISTANT

## 2020-08-27 PROCEDURE — 80053 COMPREHEN METABOLIC PANEL: CPT | Performed by: PHYSICIAN ASSISTANT

## 2020-08-27 PROCEDURE — 86200 CCP ANTIBODY: CPT | Performed by: PHYSICIAN ASSISTANT

## 2020-08-27 PROCEDURE — 84443 ASSAY THYROID STIM HORMONE: CPT | Performed by: PHYSICIAN ASSISTANT

## 2020-08-27 PROCEDURE — 36415 COLL VENOUS BLD VENIPUNCTURE: CPT | Performed by: PHYSICIAN ASSISTANT

## 2020-08-27 PROCEDURE — 86431 RHEUMATOID FACTOR QUANT: CPT | Performed by: PHYSICIAN ASSISTANT

## 2020-08-27 PROCEDURE — 99214 OFFICE O/P EST MOD 30 MIN: CPT | Performed by: PHYSICIAN ASSISTANT

## 2020-08-27 PROCEDURE — 85025 COMPLETE CBC W/AUTO DIFF WBC: CPT | Performed by: PHYSICIAN ASSISTANT

## 2020-08-27 PROCEDURE — 86618 LYME DISEASE ANTIBODY: CPT | Performed by: PHYSICIAN ASSISTANT

## 2020-08-27 ASSESSMENT — PAIN SCALES - GENERAL: PAINLEVEL: SEVERE PAIN (6)

## 2020-08-27 NOTE — PROGRESS NOTES
Subjective     Clint Alvarez is a 45 year old male who presents to clinic today for the following health issues:    HPI       Concern - right ankle pain  Onset: 1 year  Description:  Right ankle pain  Intensity: moderate, severe  Progression of Symptoms:  worsening  Accompanying Signs & Symptoms: swelling  Previous history of similar problem: no  Precipitating factors:        Worsened by: activity  Alleviating factors:        Improved by: nothing  Therapies tried and outcome: Tylenol arthritis, Advil; relief    Patient is a 45 year old male who presents today with concern about mulitple joint pain. He says that his ankles, knees, wrists and hands have been aching off/on for the past 1-2 years at least. He was seen at the [facility] ED in July 2020 at which time his right ankle was noted to be tender and swollen, more so over the lateral malleolus. The labs at the visit showed elevated CRP and mildly elevated uric acid. The patient informs me that he has been avoiding purine rich foods, does not drink alcohol and has been using aleve or similar NSAID to help with pain and swelling. Denies recent injury/illness, fever, rash, sores in mouth, changes to bladder or bowels, indigestion, headaches, changes to vision/hearing or tingling/numbness. The patient says that he has history of lyme disease which was treated with antibiotics. He has a history of atopic dermatitis managed with OTC topical creams.     Review of Systems   Constitutional, HEENT, cardiovascular, pulmonary, gi and gu systems are negative, except as otherwise noted.      Objective    /88 (BP Location: Right arm, Patient Position: Chair, Cuff Size: Adult Large)   Pulse 72   Temp 97.1  F (36.2  C) (Temporal)   Resp 16   Wt 114.3 kg (252 lb)   SpO2 95%   BMI 40.06 kg/m    Body mass index is 40.06 kg/m .  Physical Exam   GENERAL: healthy, alert and no distress  EYES: Eyes grossly normal to inspection, PERRL and conjunctivae and sclerae  normal  HENT: ear canals and TM's normal, nose and mouth without ulcers or lesions  NECK: no adenopathy, no asymmetry, masses, or scars and thyroid normal to palpation  RESP: lungs clear to auscultation - no rales, rhonchi or wheezes  CV: regular rate and rhythm, normal S1 S2, no S3 or S4, no murmur, click or rub, no peripheral edema and peripheral pulses strong  MS: no gross musculoskeletal defects noted, mildly swollen right ankle, tender to palpation over malleoli, normal AROM of ankles, knees, hips, shoulders, elbows, wrists and fingers. 5/5 strength with resisted flex/ext of ankles/knees/shoulders, 5/5 strength with resisted abduction of shoulders. Equal  strength bilaterally  NEURO: Normal strength and tone, mentation intact and speech normal  PSYCH: mentation appears normal, affect normal/bright    Results for orders placed or performed in visit on 08/27/20 (from the past 24 hour(s))   ESR: Erythrocyte sedimentation rate   Result Value Ref Range    Sed Rate 11 0 - 15 mm/h   CRP, inflammation   Result Value Ref Range    CRP Inflammation 14.8 (H) 0.0 - 8.0 mg/L   Comprehensive metabolic panel (BMP + Alb, Alk Phos, ALT, AST, Total. Bili, TP)   Result Value Ref Range    Sodium 140 133 - 144 mmol/L    Potassium 4.3 3.4 - 5.3 mmol/L    Chloride 109 94 - 109 mmol/L    Carbon Dioxide 24 20 - 32 mmol/L    Anion Gap 7 3 - 14 mmol/L    Glucose 95 70 - 99 mg/dL    Urea Nitrogen 13 7 - 30 mg/dL    Creatinine 0.99 0.66 - 1.25 mg/dL    GFR Estimate 90 >60 mL/min/[1.73_m2]    GFR Estimate If Black >90 >60 mL/min/[1.73_m2]    Calcium 9.1 8.5 - 10.1 mg/dL    Bilirubin Total 0.5 0.2 - 1.3 mg/dL    Albumin 3.7 3.4 - 5.0 g/dL    Protein Total 7.8 6.8 - 8.8 g/dL    Alkaline Phosphatase 105 40 - 150 U/L    ALT 47 0 - 70 U/L    AST 16 0 - 45 U/L   CBC with platelets and differential   Result Value Ref Range    WBC 8.9 4.0 - 11.0 10e9/L    RBC Count 5.47 4.4 - 5.9 10e12/L    Hemoglobin 15.7 13.3 - 17.7 g/dL    Hematocrit 45.5  40.0 - 53.0 %    MCV 83 78 - 100 fl    MCH 28.7 26.5 - 33.0 pg    MCHC 34.5 31.5 - 36.5 g/dL    RDW 12.0 10.0 - 15.0 %    Platelet Count 329 150 - 450 10e9/L    Diff Method Automated Method     % Neutrophils 51.8 %    % Lymphocytes 36.1 %    % Monocytes 9.1 %    % Eosinophils 1.5 %    % Basophils 1.3 %    % Immature Granulocytes 0.2 %    Nucleated RBCs 0 0 /100    Absolute Neutrophil 4.6 1.6 - 8.3 10e9/L    Absolute Lymphocytes 3.2 0.8 - 5.3 10e9/L    Absolute Monocytes 0.8 0.0 - 1.3 10e9/L    Absolute Basophils 0.1 0.0 - 0.2 10e9/L    Abs Immature Granulocytes 0.0 0 - 0.4 10e9/L    Absolute Nucleated RBC 0.0    TSH with free T4 reflex   Result Value Ref Range    TSH 2.26 0.40 - 4.00 mU/L           Assessment & Plan     Multiple joint pain  Discussed with patient workup for alternative causes for multiple joint pain. Advised that he utilize ice, elevation and NSAIDs as needed for management of symptoms. Reviewed educational information on arthralgia.   - Rheumatoid factor  - Cyclic Citrullinated Peptide Antibody IgG  - ESR: Erythrocyte sedimentation rate  - CRP, inflammation  - Lyme Disease Sussy with reflex to WB Serum  - Comprehensive metabolic panel (BMP + Alb, Alk Phos, ALT, AST, Total. Bili, TP)  - CBC with platelets and differential  - TSH with free T4 reflex    Weight gain  Patient reports that he has been dieting and trying to walk 1 hour daily, but continues to gain weight. Consider bariatric consult pending lab results.   - Rheumatoid factor  - Cyclic Citrullinated Peptide Antibody IgG  - ESR: Erythrocyte sedimentation rate  - CRP, inflammation  - Lyme Disease Sussy with reflex to WB Serum  - Comprehensive metabolic panel (BMP + Alb, Alk Phos, ALT, AST, Total. Bili, TP)  - CBC with platelets and differential  - TSH with free T4 reflex    Atopic dermatitis, unspecified type  Patient advised not to pick at skin due to risk of infection. He will continue to maintain good hygiene and use OTC hydrocortisone as  "needed.   - Rheumatoid factor  - Cyclic Citrullinated Peptide Antibody IgG  - ESR: Erythrocyte sedimentation rate  - CRP, inflammation  - Lyme Disease Sussy with reflex to WB Serum  - Comprehensive metabolic panel (BMP + Alb, Alk Phos, ALT, AST, Total. Bili, TP)  - CBC with platelets and differential  - TSH with free T4 reflex     BMI:   Estimated body mass index is 40.06 kg/m  as calculated from the following:    Height as of 2/11/20: 1.689 m (5' 6.5\").    Weight as of this encounter: 114.3 kg (252 lb).   Weight management plan: Discussed healthy diet and exercise guidelines        Return in about 6 months (around 2/27/2021) for Return for scheduled annual checkup with PCP.    Israel Matthews PA-C  [facility]    "

## 2020-08-27 NOTE — NURSING NOTE
Health Maintenance Due   Topic Date Due     HIV SCREENING  07[birth year]07[birth year]1988     PREVENTIVE CARE VISIT  10/26/2019     DTAP/TDAP/TD IMMUNIZATION (2 - Td) 12/10/2019     PHQ-9  08/11/2020     INFLUENZA VACCINE (1) 09/01/2020     Cally BOWENS LPN

## 2020-08-27 NOTE — PATIENT INSTRUCTIONS
Patient Education     Arthralgia    Arthralgia is the term for pain in or around the joint. It is a symptom, not a disease. This pain may involve one or more joints. In some cases, the pain moves from joint to joint.  There are many causes for joint pain. These include:    Injury    Osteoarthritis (wearing out of the joint surface)    Gout (inflammation of the joint due to crystals in the joint fluid)    Infection inside the joint      Bursitis (inflammation of the fluid-filled sacs around the joint)    Autoimmune disorders such as rheumatoid arthritis or lupus    Tendonitis (inflammation of chords that attach muscle to bone)  Home care    Rest the involved joint(s) until your symptoms improve.     You may be prescribed pain medicine. If none is prescribed, you may use acetaminophen or ibuprofen to control pain and inflammation.    Follow-up care  Follow up with your healthcare provider or as advised.  When to seek medical advice  Contact your healthcare provider right away if any of the following occurs:    Pain, swelling, or redness of joint increases    Pain worsens or recurs after a period of improvement    Pain moves to other joints    You cannot bear weight on the affected joint     You cannot move the affected joint    Joint appears deformed    New rash appears    Fever of 100.4 F (38 C) or higher, or as directed by your healthcare provider  Date Last Reviewed: 3/1/2017    4376-7012 [employer]. [address] [place] [address], [place] [address]. All rights reserved. This information is not intended as a substitute for professional medical care. Always follow your healthcare professional's instructions.

## 2020-08-28 LAB
B BURGDOR IGG+IGM SER QL: 0.28 (ref 0–0.89)
CCP AB SER IA-ACNC: 2 U/ML
RHEUMATOID FACT SER NEPH-ACNC: <7 IU/ML (ref 0–20)

## 2020-08-28 ASSESSMENT — PATIENT HEALTH QUESTIONNAIRE - PHQ9: SUM OF ALL RESPONSES TO PHQ QUESTIONS 1-9: 0

## 2020-08-30 DIAGNOSIS — M25.571 PAIN IN JOINT, ANKLE AND FOOT, RIGHT: Primary | ICD-10-CM

## 2020-10-17 ENCOUNTER — NURSE TRIAGE (OUTPATIENT)
Dept: NURSING | Facility: CLINIC | Age: 45
End: 2020-10-17

## 2020-10-17 ENCOUNTER — VIRTUAL VISIT (OUTPATIENT)
Dept: URGENT CARE | Facility: CLINIC | Age: 45
End: 2020-10-17
Payer: COMMERCIAL

## 2020-10-17 DIAGNOSIS — Z20.822 EXPOSURE TO COVID-19 VIRUS: ICD-10-CM

## 2020-10-17 DIAGNOSIS — R05.9 COUGH: Primary | ICD-10-CM

## 2020-10-17 PROCEDURE — 99213 OFFICE O/P EST LOW 20 MIN: CPT | Mod: TEL | Performed by: PHYSICIAN ASSISTANT

## 2020-10-17 ASSESSMENT — ENCOUNTER SYMPTOMS
NAUSEA: 1
DIARRHEA: 1
CHEST TIGHTNESS: 1
APPETITE CHANGE: 0
MYALGIAS: 1
FATIGUE: 1
RHINORRHEA: 0
COUGH: 1
SORE THROAT: 1
DIAPHORESIS: 1
FEVER: 0
HEADACHES: 1
CHILLS: 0
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
## 2020-10-17 NOTE — TELEPHONE ENCOUNTER
Doesn't have a thermometer - states he's been having a dry cough, achy/tired, states report burning pain in chest. Is likely exposed to covid at college. Wasn't able to get [employer] to work for him. Has had symptoms for 4-5 days.     Per protocol advised phone UC visit - warm transferred to scheduling.    COVID 19 Nurse Triage Plan/Patient Instructions    Please be aware that novel coronavirus (COVID-19) may be circulating in the community. If you develop symptoms such as fever, cough, or SOB or if you have concerns about the presence of another infection including coronavirus (COVID-19), please contact your health care provider or visit [email or web address].     Disposition/Instructions    Virtual Visit with provider recommended. Reference Visit Selection Guide.    Thank you for taking steps to prevent the spread of this virus.  o Limit your contact with others.  o Wear a simple mask to cover your cough.  o Wash your hands well and often.    Resources    [facility]: About COVID-19: [email or web address]    CDC: What to Do If You're Sick: www.cdc.gov/coronavirus/2019-ncov/about/steps-when-sick.html    CDC: Ending Home Isolation: www.cdc.gov/coronavirus/2019-ncov/hcp/disposition-in-home-patients.html     CDC: Caring for Someone: www.cdc.gov/coronavirus/2019-ncov/if-you-are-sick/care-for-someone.html     [facility]: Interim Guidance for Hospital Discharge to Home: www.health.[facility].mn.us/diseases/coronavirus/hcp/hospdischarge.pdf    [facility] clinical trials (COVID-19 research studies): clinicalaffairs.[email or web address]/n-clinical-trials     Below are the COVID-19 hotlines at the [facility] ([facility]). Interpreters are available.   o For health questions: Call 115-474-9792 or 1-862.101.6926 (7 a.m. to 7 p.m.)  o For questions about schools and childcare: Call 238-153-5720 or 1-971.773.2255 (7 a.m. to 7 p.m.)     Haylee Larry RN on 10/17/2020 at 6:35 PM    Reason for Disposition    Chest pain  or pressure    Additional Information    Negative: SEVERE difficulty breathing (e.g., struggling for each breath, speaks in single words)    Negative: Difficult to awaken or acting confused (e.g., disoriented, slurred speech)    Negative: Bluish (or gray) lips or face now    Negative: Shock suspected (e.g., cold/pale/clammy skin, too weak to stand, low BP, rapid pulse)    Negative: Sounds like a life-threatening emergency to the triager    Negative: [1] COVID-19 exposure AND [2] no symptoms    Negative: COVID-19 and Breastfeeding, questions about    Negative: [1] Adult with possible COVID-19 symptoms AND [2] triager concerned about severity of symptoms or other causes    Negative: SEVERE or constant chest pain or pressure (Exception: mild central chest pain, present only when coughing)    Negative: MODERATE difficulty breathing (e.g., speaks in phrases, SOB even at rest, pulse 100-120)    Negative: Patient sounds very sick or weak to the triager    Negative: MILD difficulty breathing (e.g., minimal/no SOB at rest, SOB with walking, pulse <100)    Protocols used: CORONAVIRUS (COVID-19) DIAGNOSED OR [id number] 8.4.20

## 2020-12-28 ENCOUNTER — TELEPHONE (OUTPATIENT)
Dept: FAMILY MEDICINE | Facility: CLINIC | Age: 45
End: 2020-12-28

## 2020-12-28 ENCOUNTER — OFFICE VISIT (OUTPATIENT)
Dept: INTERNAL MEDICINE | Facility: CLINIC | Age: 45
End: 2020-12-28
Payer: COMMERCIAL

## 2020-12-28 VITALS
RESPIRATION RATE: 18 BRPM | BODY MASS INDEX: 40.86 KG/M2 | HEART RATE: 88 BPM | SYSTOLIC BLOOD PRESSURE: 128 MMHG | DIASTOLIC BLOOD PRESSURE: 76 MMHG | WEIGHT: 257 LBS | OXYGEN SATURATION: 94 % | TEMPERATURE: 97 F

## 2020-12-28 DIAGNOSIS — B37.2 YEAST DERMATITIS: Primary | ICD-10-CM

## 2020-12-28 DIAGNOSIS — B37.2 YEAST DERMATITIS: ICD-10-CM

## 2020-12-28 PROCEDURE — 99213 OFFICE O/P EST LOW 20 MIN: CPT | Performed by: INTERNAL MEDICINE

## 2020-12-28 RX ORDER — CLOTRIMAZOLE AND BETAMETHASONE DIPROPIONATE 10; .64 MG/G; MG/G
CREAM TOPICAL 2 TIMES DAILY
Qty: 45 G | Refills: 0 | Status: SHIPPED | OUTPATIENT
Start: 2020-12-28 | End: 2023-10-27

## 2020-12-28 RX ORDER — FLUCONAZOLE 150 MG/1
TABLET ORAL
Qty: 4 TABLET | Refills: 0 | Status: SHIPPED | OUTPATIENT
Start: 2020-12-28 | End: 2020-12-28

## 2020-12-28 RX ORDER — CLOTRIMAZOLE AND BETAMETHASONE DIPROPIONATE 10; .64 MG/G; MG/G
CREAM TOPICAL 2 TIMES DAILY
Qty: 45 G | Refills: 0 | Status: SHIPPED | OUTPATIENT
Start: 2020-12-28 | End: 2020-12-28

## 2020-12-28 RX ORDER — FLUCONAZOLE 150 MG/1
TABLET ORAL
Qty: 4 TABLET | Refills: 0 | Status: SHIPPED | OUTPATIENT
Start: 2020-12-28 | End: 2023-10-27

## 2020-12-28 ASSESSMENT — PAIN SCALES - GENERAL: PAINLEVEL: NO PAIN (0)

## 2020-12-28 NOTE — TELEPHONE ENCOUNTER
Armando was seen today, and said prescriptions should have been sent to the pharmacy  I am following up for Armando.     Thanks  Manjula Mir [facility]   [phone]

## 2020-12-28 NOTE — PROGRESS NOTES
Subjective     Clint Alvarez is a 45 year old male who presents to clinic today for the following health issues:    HPI         Chief Complaint   Patient presents with     Derm Problem     recheck 2 years. Starting to spread. Behind ear, eye lid, low back, arm pits, chest           EMR reviewed including: History of chief complaint, pertinent distant history, medications, concurrent diagnoses.             Chief Complaint:  #1   Rash behind ears and in groin and buttocks area  Present for the last several weeks  History of yeast dermatitis  Does have occasional cheesy material in skin folds near groin and gluteal cleft.                         PAST, FAMILY,SOCIAL HISTORY:     Medical  History:   has a past medical history of DDD (degenerative disc disease), Gallstone (11/28/2012), Kidney stone, and Lyme disease.     Surgical History:   has a past surgical history that includes Insert chest tube.     Social History:   reports that he quit smoking about 2 years ago. He has a 10.00 pack-year smoking history. He has never used smokeless tobacco. He reports that he does not drink alcohol or use drugs.     Family History:  family history is not on file.            MEDICATIONS  Current Outpatient Medications   Medication Sig Dispense Refill     acetaminophen (TYLENOL) 500 MG tablet Take 500-1,000 mg by mouth every 6 hours as needed for mild pain       aspirin 81 MG EC tablet Take 81 mg by mouth daily       clotrimazole-betamethasone (LOTRISONE) 1-0.05 % external cream Apply topically 2 times daily 45 g 0     fluconazole (DIFLUCAN) 150 MG tablet 1 pill every three days for 4 doses 4 tablet 0     NAPROXEN PO Take 250 mg by mouth 2 times daily as needed        Vitamin D, Cholecalciferol, 1000 units TABS Take 2,000 Units by mouth daily       meloxicam (MOBIC) 15 MG tablet Take 1 tablet (15 mg) by mouth daily 30 tablet 0     niacin 100 MG tablet Take by mouth daily (with breakfast)       order for DME Equipment being ordered:  patellar stabilization knee brace with horseshoe, XL (Patient not taking: Reported on 2/11/2020) 1 Device 0         --------------------------------------------------------------------------------------------------------------------                              Review of Systems       LUNGS: Pt denies: cough, excess sputum, hemoptysis, or shortness of breath.   HEART: Pt denies: chest pain, arrhythmia, syncope, tachy or bradyarrhythmia.   GI: Pt denies: nausea, vomiting, diarrhea, constipation, melena, or hematochezia.   NEURO: Pt denies: seizures, strokes, diplopia, weakness, paraesthesias, or paralysis.   SKIN: Pt denies: itching, rashes, discoloration, or specific lesions of concern. Denies recent hair loss.   PSYCH: The patient denies significant depression, anxiety, mood imbalance. Specifically denies any suicidal ideation.      Examination       /76 (BP Location: Right arm, Patient Position: Sitting, Cuff Size: Adult Large)   Pulse 88   Temp 97  F (36.1  C) (Temporal)   Resp 18   Wt 116.6 kg (257 lb)   SpO2 94%   BMI 40.86 kg/m         SKIN:  warm and dry.  Erythema in the groin and gluteal cleft is noted.  It is associated with some hyphae present.  Skin cracking and irritation behind the ears is noted.  This is more eczematous in appearance.   LUNGS: clear bilaterally, airflow is brisk, no intercostal retraction or stridor is noted. No coughing is noted during visit.   HEART:  regular without rubs, clicks, gallops, or murmurs. PMI is nondisplaced. Upstrokes are brisk. S1,S2 are heard.       Decision Making       1. Yeast dermatitis  We will place on Diflucan and use Lotrisone behind the ears.  - fluconazole (DIFLUCAN) 150 MG tablet; 1 pill every three days for 4 doses  Dispense: 4 tablet; Refill: 0  - clotrimazole-betamethasone (LOTRISONE) 1-0.05 % external cream; Apply topically 2 times daily  Dispense: 45 g; Refill: 0                                 FOLLOW UP   I have asked the patient to make  an appointment for followup with me as needed.  He will follow-up with his primary care provider for his routine health care            I have carefully explained the diagnosis and treatment options to the patient.  The patient has displayed an understanding of the above, and all subsequent questions were answered.      DO TAWNY Lewis    Portions of this note were produced using [employer]  Although every attempt at real-time proof reading has been made, occasional grammar/syntax errors may have been missed.

## 2020-12-28 NOTE — TELEPHONE ENCOUNTER
Patient had originally requested the [facility].  Has been redirected toward [facility].    González

## 2020-12-28 NOTE — LETTER
[facility]  12/28/20    Patient: Clint Alvarez  YOB: 1975  Medical Record Number: [id number]  CSN: [id number]                                                                              Non-opioid Controlled Substance Agreement    I understand that my care provider has prescribed a controlled substance to help manage my condition(s). I am taking this medicine to help me function or work. I know this is strong medicine, and that it can cause serious side effects. Controlled substances can be sedating, addicting and may cause a dependency on the drug. They can affect my ability to drive or think, and cause depression. They need to be taken exactly as prescribed. Combining controlled substances with certain medicines or chemicals (such as cocaine, sedatives and tranquilizers, sleeping pills, meth) can be dangerous or even fatal. Also, if I stop controlled substances suddenly, I may have severe withdrawal symptoms.  If not helpful, I may be asked to stop them.    The risks, benefits, and side effects of these medicine(s) were explained to me. I agree that:    1. I will take part in other treatments as advised by my care team. This may be psychiatry or counseling, physical therapy, behavioral therapy, group treatment or a referral to a pain clinic. I will reduce or stop my medicine when my care team tells me to do so.  2. I will take my medicines as prescribed. I will not change the dose or schedule unless my care team tells me to. There will be no refills if I  run out early.   I may be contactedwithout warning and asked to complete a urine drug test or pill count at any time.   3. I will keep all my appointments, and understand this is part of the monitoring of controlled substances. My care team may require an office visit for EVERY controlled substance refill. If I miss appointments or don t follow instructions, my care team may stop my medicine.  4. I will not ask  other providers to prescribe controlled substances, and I will not accept controlled substances from other people. If I need another prescribed controlled substance for a new reason, I will tell my care team within 1 business day.  5. I will use one pharmacy to fill all of my controlled substance prescriptions, and it is up to me to make sure that I do not run out of my medicines on weekends or holidays. If my care team is willing to refill my controlled substance prescription without a visit, I must request refills only during office hours, refills may take up to 3 days to process, and it may take up to 5 to 7 days for my medicine to be mailed and ready at my pharmacy. Prescriptions will not be mailed anywhere except my pharmacy.    6. I am responsible for my prescriptions. If the medicine/prescription is lost or stolen, it will not be replaced. I also agree not to share controlled substance medicines with anyone.              [facility]  12/28/20  Patient:  Clint Alvarez  YOB: 1975  Medical Record Number: [id number]  [facility]: [id number]    7. I agree to not use ANY illegal or recreational drugs. This includes marijuana, cocaine, bath salts or other drugs. I agree not to use alcohol unless my care team says I may. I agree to give urine samples whenever asked. If I don t give a urine sample, the care team may stop my medicine.    8. If I enroll in the [place] Medical Marijuana program, I will tell my care team. I will also sign an agreement to share my medical records with my care team.    9. I will bring in my list of medicines (or my medicine bottles) each time I come to the clinic.   10. I will tell my care team right away if I become pregnant or have a new medical problem treated outside of my regular clinic.  11. I understand that this medicine can affect my thinking and judgment. It may be unsafe for me to drive, use machinery and do dangerous tasks. I will not do any  of these things until I know how the medicine affects me. If my dose changes, I will wait to see how it affects me. I will contact my care team if I have concerns about medicine side effects.    I understand that if I do not follow any of the conditions above, my prescriptions or treatment may be stopped.      I agree that my provider, clinic care team, and pharmacy may work with any city, state or federal law enforcement agency that investigates the misuse, sale, or other diversion of my controlled medicine. I will allow my provider to discuss my care with or share a copy of this agreement with any other treating provider, pharmacy or emergency room where I receive care. I agree to give up (waive) any right of privacy or confidentiality with respect to these consents.   I have read this agreement and have asked questions about anything I did not understand.    ____________________________________________________    ____________  ________  Patient signature                                                         Date      Time    ____________________________________________________     ____________  ________  Witness                                                          Date      Time    ____________________________________________________  Provider signature

## 2021-01-14 ENCOUNTER — HEALTH MAINTENANCE LETTER (OUTPATIENT)
Age: 46
End: 2021-01-14

## 2021-06-28 ENCOUNTER — HOSPITAL ENCOUNTER (EMERGENCY)
Facility: CLINIC | Age: 46
Discharge: HOME | End: 2021-06-28
Attending: EMERGENCY MEDICINE | Admitting: EMERGENCY MEDICINE
Payer: COMMERCIAL

## 2021-06-28 VITALS
HEART RATE: 67 BPM | RESPIRATION RATE: 14 BRPM | TEMPERATURE: 97 F | SYSTOLIC BLOOD PRESSURE: 130 MMHG | OXYGEN SATURATION: 98 % | DIASTOLIC BLOOD PRESSURE: 96 MMHG

## 2021-06-28 DIAGNOSIS — K02.9 DENTAL DECAY: ICD-10-CM

## 2021-06-28 DIAGNOSIS — K08.89 TOOTH PAIN: ICD-10-CM

## 2021-06-28 PROCEDURE — 99282 EMERGENCY DEPT VISIT SF MDM: CPT | Mod: 25 | Performed by: EMERGENCY MEDICINE

## 2021-06-28 PROCEDURE — 99284 EMERGENCY DEPT VISIT MOD MDM: CPT | Performed by: EMERGENCY MEDICINE

## 2021-06-28 RX ORDER — CLINDAMYCIN HCL 300 MG
300 CAPSULE ORAL 4 TIMES DAILY
Qty: 28 CAPSULE | Refills: 0 | Status: SHIPPED | OUTPATIENT
Start: 2021-06-28 | End: 2021-07-05

## 2021-06-28 NOTE — DISCHARGE INSTRUCTIONS
Please keep upcoming dental appointment.    Clindamycin 300 mg 4 times daily x7 days.    Ibuprofen or extra strength Tylenol can be taken for tooth pain.  Your ear discomfort is referred ear pain from nerve irritation.    Recommend starting a probiotic or using yogurt to help reduce the chance for GI upset from antibiotic treatment.

## 2021-06-28 NOTE — ED TRIAGE NOTES
Presents with right sided facial pain. States it may be dental related. He has had an on going issue times one week.

## 2021-06-29 NOTE — ED PROVIDER NOTES
History     Chief Complaint   Patient presents with     Otalgia     Facial Swelling     HPI  Clint Alvarez is a 45 year old male who presents with a right-sided tooth pain, mild facial swelling and right ear pain.  Symptoms have been intermittent for last few weeks.  He knows he needs to get into see a dentist.  No difficulty swallowing or breathing.  Pain is moderate currently but at times it severe.  He has noted cold sensitivity to his teeth on the right side.  Allergies:  Allergies   Allergen Reactions     No Known Drug Allergy        Problem List:    Patient Active Problem List    Diagnosis Date Noted     Morbid obesity (H) 2019     Priority: Medium     Moderate recurrent major depression (H), with mixed features 2019     Priority: Medium     Anxiety, unspecified 2019     Priority: Medium     Dysthymia 10/26/2018     Priority: Medium        Past Medical History:    Past Medical History:   Diagnosis Date     DDD (degenerative disc disease)      Gallstone 2012     Kidney stone      Lyme disease        Past Surgical History:    Past Surgical History:   Procedure Laterality Date     INSERT CHEST TUBE      1994       Family History:    No family history on file.    Social History:  Marital Status:   [4]  Social History     Tobacco Use     Smoking status: Former Smoker     Packs/day: 0.50     Years: 20.00     Pack years: 10.00     Quit date: 2018     Years since quittin.7     Smokeless tobacco: Never Used   Substance Use Topics     Alcohol use: No     Drug use: No        Medications:    acetaminophen (TYLENOL) 500 MG tablet  aspirin 81 MG EC tablet  clindamycin (CLEOCIN) 300 MG capsule  NAPROXEN PO  clotrimazole-betamethasone (LOTRISONE) 1-0.05 % external cream  fluconazole (DIFLUCAN) 150 MG tablet  meloxicam (MOBIC) 15 MG tablet  niacin 100 MG tablet  order for DME  Vitamin D, Cholecalciferol, 1000 units TABS          Review of Systems   All other systems reviewed and  are negative.      Physical Exam   BP: (!) 130/96  Pulse: 67  Temp: 97  F (36.1  C)  Resp: 14  SpO2: 98 %      Physical Exam  Vitals signs and nursing note reviewed.   Constitutional:       Appearance: He is not ill-appearing.   HENT:      Head: Normocephalic.      Right Ear: Tympanic membrane, ear canal and external ear normal.      Left Ear: Tympanic membrane, ear canal and external ear normal.      Nose: Nose normal.      Mouth/Throat:        Comments: On inspection and the patient's teeth show a dental caries.  Acute pain currently appears to be coming from the #30 tooth.  There is no periapical abscess.  No obvious facial swelling causing asymmetry.  Salivary glands are not involved.  No cervical adenopathy.   Eyes:      Conjunctiva/sclera: Conjunctivae normal.   Cardiovascular:      Rate and Rhythm: Normal rate.   Pulmonary:      Effort: Pulmonary effort is normal.   Skin:     General: Skin is warm.      Capillary Refill: Capillary refill takes less than 2 seconds.      Findings: No rash.   Neurological:      General: No focal deficit present.      Mental Status: He is alert and oriented to person, place, and time.   Psychiatric:         Mood and Affect: Mood normal.         Behavior: Behavior normal.         ED Course        Procedures                   No results found for this or any previous visit (from the past 24 hour(s)).    Medications - No data to display    Assessments & Plan (with Medical Decision Making)  45 old male.  Numerous dental caries.  Acute right lower tooth pain around the #30 tooth.  Causing some referred otalgia.  Placed on clindamycin.  Advised to take probiotics.  He is to see a dentist.     I have reviewed the nursing notes.    I have reviewed the findings, diagnosis, plan and need for follow up with the patient.      Discharge Medication List as of 6/28/2021  5:18 PM      START taking these medications    Details   clindamycin (CLEOCIN) 300 MG capsule Take 1 capsule (300 mg) by  mouth 4 times daily for 7 days, Disp-28 capsule, R-0, E-Prescribe             Final diagnoses:   Tooth pain   Dental decay       6/28/2021   [facility] EMERGENCY DEPT     Chico Gustafson,   06/29/21 0716

## 2021-10-24 ENCOUNTER — HEALTH MAINTENANCE LETTER (OUTPATIENT)
Age: 46
End: 2021-10-24

## 2022-01-03 ENCOUNTER — MYC MEDICAL ADVICE (OUTPATIENT)
Dept: FAMILY MEDICINE | Facility: CLINIC | Age: 47
End: 2022-01-03
Payer: COMMERCIAL

## 2022-01-04 NOTE — TELEPHONE ENCOUNTER
Patient is looking to get a comfort animal.  Advised patient follow up with [facility] since he has been seen there.

## 2022-02-12 ENCOUNTER — HEALTH MAINTENANCE LETTER (OUTPATIENT)
Age: 47
End: 2022-02-12

## 2022-06-16 ENCOUNTER — TRANSFERRED RECORDS (OUTPATIENT)
Dept: HEALTH INFORMATION MANAGEMENT | Facility: CLINIC | Age: 47
End: 2022-06-16

## 2022-06-20 ENCOUNTER — MEDICAL CORRESPONDENCE (OUTPATIENT)
Dept: HEALTH INFORMATION MANAGEMENT | Facility: CLINIC | Age: 47
End: 2022-06-20

## 2022-06-21 ENCOUNTER — TELEPHONE (OUTPATIENT)
Dept: GASTROENTEROLOGY | Facility: CLINIC | Age: 47
End: 2022-06-21
Payer: COMMERCIAL

## 2022-06-21 ENCOUNTER — TRANSCRIBE ORDERS (OUTPATIENT)
Dept: OTHER | Age: 47
End: 2022-06-21
Payer: COMMERCIAL

## 2022-06-21 DIAGNOSIS — Z00.00 ENCOUNTER FOR GENERAL ADULT MEDICAL EXAMINATION WITHOUT ABNORMAL FINDINGS: Primary | ICD-10-CM

## 2022-06-21 NOTE — TELEPHONE ENCOUNTER
Screening Questions  BlueKIND OF PREP RedLOCATION [review exclusion criteria] GreenSEDATION TYPE  1. Have you had a positive covid test in the last 90 days? N  a. If yes, what date?     1. Do you have a legal guardian or medical Power of ?  Are you able to give consent for your medical care?Y (Sedation review/consideration needed)    2. Are you active on mychart? Y    3. What insurance is in the chart? BP     3.   Ordering/Referring Provider: David Mai MD     4. BMI 39.2 [BMI OVER 40-EXTENDED PREP]  If greater than 40 review exclusion criteria [PAC APPT IF @ [facility]]      5.  Respiratory Screening :  [If yes to any of the following HOSPITAL setting only]     Do you use daily home oxygen? N    Do you have mod to severe Obstructive Sleep Apnea? N  [OKAY @ [facility] SH PH RI]   Do you have Pulmonary Hypertension? N     Do you have UNCONTROLLED asthma? N        6.   Have you had a heart or lung transplant? N      7.   Are you currently on dialysis? N [ If yes, G-PREP & HOSPITAL setting only]     8.   Do you have chronic kidney disease? N [ If yes, G-PREP ]    9.   Have you had a stroke or Transient ischemic attack (TIA - aka  mini stroke ) within 6 months?  N (If yes, please review exclusion criteria)    10.   In the past 6 months, have you had any heart related issues including cardiomyopathy or heart attack? N           If yes, did it require cardiac stenting or other implantable device? N      11.   Do you have any implantable devices in your body (pacemaker, defib, LVAD)? N (If yes, please review exclusion criteria)    12.   Do you take nitroglycerin? N           If yes, how often? N  (if yes, HOSPITAL setting ONLY)    13.   Are you currently taking any blood thinners? N           [IF YES, INFORM PATIENT TO FOLLOW UP W/ ORDERING PROVIDER FOR BRIDGING INSTRUCTIONS]     14.   Do you have a diagnosis of diabetes? N   [ If yes, G-PREP ]    15.   [FEMALES] Are you currently pregnant?     If yes, how  many weeks?     16.   Are you taking any prescription pain medications on a routine schedule?  N  [ If yes, EXTENDED PREP.] [If yes, MAC]    17.   Do you have any chemical dependencies such as alcohol, street drugs, or methadone?  N [If yes, MAC]    18.   Do you have any history of post-traumatic stress syndrome, severe anxiety or history of psychosis?  N  [If yes, MAC]    19.   Do you transfer independently?  Y    20.  On a regular basis do you go 3-5 days between bowel movements? N   [ If yes, EXTENDED PREP.]    21.   Preferred LOCAL Pharmacy for Pre Prescription      [facility]      Scheduling Details      Caller :  Clint   (Please ask for phone number if not scheduled by patient)    Type of Procedure Scheduled: Lower Endoscopy [Colonoscopy]  Which Colonoscopy Prep was Sent?: [facility] PATIENTS & [facility] PATIENTS NEEDS EXTENDED PREP  Surgeon: OSWALD PRICE  Date of Procedure: 8/25  Location: [facility]    Sedation Type: CS    Conscious Sedation- Needs  for 6 hours after the procedure  MAC/General-Needs  for 24 hours after procedure    Pre-op Required at [facility] [place], [place] and OR for MAC sedation: N  (advise patient they will need a pre-op prior to procedure -)      Informed patient they will need an adult  Y  Cannot take any type of public or medical transportation alone    Pre-Procedure Covid test to be completed at [facility] or Externally: HOME    Confirmed Nurse will call to complete assessment Y    Additional comments:

## 2022-06-22 ENCOUNTER — TRANSFERRED RECORDS (OUTPATIENT)
Dept: HEALTH INFORMATION MANAGEMENT | Facility: CLINIC | Age: 47
End: 2022-06-22

## 2022-08-16 ENCOUNTER — TELEPHONE (OUTPATIENT)
Dept: GASTROENTEROLOGY | Facility: CLINIC | Age: 47
End: 2022-08-16

## 2022-08-16 DIAGNOSIS — Z12.11 ENCOUNTER FOR SCREENING COLONOSCOPY: Primary | ICD-10-CM

## 2022-08-16 RX ORDER — BISACODYL 5 MG/1
TABLET, DELAYED RELEASE ORAL
Qty: 4 TABLET | Refills: 0 | Status: SHIPPED | OUTPATIENT
Start: 2022-08-16 | End: 2023-10-27

## 2022-08-16 RX ORDER — BISACODYL 5 MG/1
TABLET, DELAYED RELEASE ORAL
Qty: 4 TABLET | Refills: 0 | Status: SHIPPED | OUTPATIENT
Start: 2022-08-16 | End: 2022-08-16

## 2022-08-16 NOTE — TELEPHONE ENCOUNTER
Attempted to contact patient regarding upcoming colonoscopy  procedure on 8/25/22 for pre assessment questions. No answer.     Left message to return call to [phone] #3    Covid test scheduled? No Discuss at home rapid antigen COVID test 1-2 days prior to procedure.    Arrival time: 1140    Facility location: [facility]    Sedation type: CS     Indication for procedure: screening     Anticoagulants: no.     Bowel prep recommendation: Golytely d/t mg citrate recall (borderline BMI 39.2)    Golytely script sent to [id number] - [place], [place] - [address] [place] [address]. Prep instructions sent via [employer].     Lucy Potter RN

## 2022-08-16 NOTE — TELEPHONE ENCOUNTER
Patient returned call.     Pre assessment questions completed for upcoming colonoscopy  procedure scheduled on 8/25/22    COVID policy reviewed. Patient to complete rapid antigen test one to two days before their scheduled procedure. Patient to bring photo of the results when they come in for their procedure.    Reviewed procedural arrival time 1140 and facility location [facility].    Designated  policy reviewed. Instructed to have someone stay 6 hours post procedure.     Reviewed Golytely prep instructions with patient. No fiber/iron supplements or foods that contain nuts/seeds 7 days prior to procedure.     Resent Golytely to preferred pharmacy [id number] [place], [place] - [address] [place] [address]    Patient verbalized understanding and had no questions or concerns at this time.    Lucy Potter RN

## 2022-08-25 ENCOUNTER — HOSPITAL ENCOUNTER (OUTPATIENT)
Facility: AMBULATORY SURGERY CENTER | Age: 47
Discharge: HOME | End: 2022-08-25
Attending: INTERNAL MEDICINE
Payer: COMMERCIAL

## 2022-08-25 ENCOUNTER — ANESTHESIA (OUTPATIENT)
Dept: SURGERY | Facility: AMBULATORY SURGERY CENTER | Age: 47
End: 2022-08-25
Payer: COMMERCIAL

## 2022-08-25 ENCOUNTER — ANESTHESIA EVENT (OUTPATIENT)
Dept: SURGERY | Facility: AMBULATORY SURGERY CENTER | Age: 47
End: 2022-08-25
Payer: COMMERCIAL

## 2022-08-25 VITALS
HEIGHT: 67 IN | RESPIRATION RATE: 12 BRPM | BODY MASS INDEX: 37.67 KG/M2 | WEIGHT: 240 LBS | SYSTOLIC BLOOD PRESSURE: 121 MMHG | HEART RATE: 64 BPM | TEMPERATURE: 97.1 F | OXYGEN SATURATION: 94 % | DIASTOLIC BLOOD PRESSURE: 77 MMHG

## 2022-08-25 VITALS — HEART RATE: 59 BPM

## 2022-08-25 LAB — COLONOSCOPY: NORMAL

## 2022-08-25 PROCEDURE — 45380 COLONOSCOPY AND BIOPSY: CPT | Mod: 33

## 2022-08-25 PROCEDURE — 88305 TISSUE EXAM BY PATHOLOGIST: CPT | Mod: TC | Performed by: INTERNAL MEDICINE

## 2022-08-25 RX ORDER — NALOXONE HYDROCHLORIDE 0.4 MG/ML
0.4 INJECTION, SOLUTION INTRAMUSCULAR; INTRAVENOUS; SUBCUTANEOUS
Status: DISCONTINUED | OUTPATIENT
Start: 2022-08-25 | End: 2022-08-26 | Stop reason: HOSPADM

## 2022-08-25 RX ORDER — LIDOCAINE HYDROCHLORIDE 20 MG/ML
INJECTION, SOLUTION INFILTRATION; PERINEURAL PRN
Status: DISCONTINUED | OUTPATIENT
Start: 2022-08-25 | End: 2022-08-25

## 2022-08-25 RX ORDER — LIDOCAINE 40 MG/G
CREAM TOPICAL
Status: DISCONTINUED | OUTPATIENT
Start: 2022-08-25 | End: 2022-08-25 | Stop reason: HOSPADM

## 2022-08-25 RX ORDER — NALOXONE HYDROCHLORIDE 0.4 MG/ML
0.2 INJECTION, SOLUTION INTRAMUSCULAR; INTRAVENOUS; SUBCUTANEOUS
Status: DISCONTINUED | OUTPATIENT
Start: 2022-08-25 | End: 2022-08-26 | Stop reason: HOSPADM

## 2022-08-25 RX ORDER — ONDANSETRON 2 MG/ML
4 INJECTION INTRAMUSCULAR; INTRAVENOUS EVERY 6 HOURS PRN
Status: DISCONTINUED | OUTPATIENT
Start: 2022-08-25 | End: 2022-08-26 | Stop reason: HOSPADM

## 2022-08-25 RX ORDER — PROCHLORPERAZINE MALEATE 10 MG
10 TABLET ORAL EVERY 6 HOURS PRN
Status: DISCONTINUED | OUTPATIENT
Start: 2022-08-25 | End: 2022-08-26 | Stop reason: HOSPADM

## 2022-08-25 RX ORDER — PROPOFOL 10 MG/ML
INJECTION, EMULSION INTRAVENOUS CONTINUOUS PRN
Status: DISCONTINUED | OUTPATIENT
Start: 2022-08-25 | End: 2022-08-25

## 2022-08-25 RX ORDER — PROPOFOL 10 MG/ML
INJECTION, EMULSION INTRAVENOUS PRN
Status: DISCONTINUED | OUTPATIENT
Start: 2022-08-25 | End: 2022-08-25

## 2022-08-25 RX ORDER — ONDANSETRON 2 MG/ML
4 INJECTION INTRAMUSCULAR; INTRAVENOUS
Status: DISCONTINUED | OUTPATIENT
Start: 2022-08-25 | End: 2022-08-25 | Stop reason: HOSPADM

## 2022-08-25 RX ORDER — ONDANSETRON 4 MG/1
4 TABLET, ORALLY DISINTEGRATING ORAL EVERY 6 HOURS PRN
Status: DISCONTINUED | OUTPATIENT
Start: 2022-08-25 | End: 2022-08-26 | Stop reason: HOSPADM

## 2022-08-25 RX ORDER — FLUMAZENIL 0.1 MG/ML
0.2 INJECTION, SOLUTION INTRAVENOUS
Status: DISCONTINUED | OUTPATIENT
Start: 2022-08-25 | End: 2022-08-26 | Stop reason: HOSPADM

## 2022-08-25 RX ORDER — SODIUM CHLORIDE, SODIUM LACTATE, POTASSIUM CHLORIDE, CALCIUM CHLORIDE 600; 310; 30; 20 MG/100ML; MG/100ML; MG/100ML; MG/100ML
INJECTION, SOLUTION INTRAVENOUS CONTINUOUS
Status: DISCONTINUED | OUTPATIENT
Start: 2022-08-25 | End: 2022-08-25 | Stop reason: HOSPADM

## 2022-08-25 RX ORDER — SIMETHICONE
LIQUID (ML) MISCELLANEOUS PRN
Status: DISCONTINUED | OUTPATIENT
Start: 2022-08-25 | End: 2022-08-25 | Stop reason: HOSPADM

## 2022-08-25 RX ADMIN — PROPOFOL 100 MG: 10 INJECTION, EMULSION INTRAVENOUS at 13:04

## 2022-08-25 RX ADMIN — SODIUM CHLORIDE, SODIUM LACTATE, POTASSIUM CHLORIDE, CALCIUM CHLORIDE: 600; 310; 30; 20 INJECTION, SOLUTION INTRAVENOUS at 12:54

## 2022-08-25 RX ADMIN — PROPOFOL 150 MCG/KG/MIN: 10 INJECTION, EMULSION INTRAVENOUS at 13:01

## 2022-08-25 RX ADMIN — LIDOCAINE HYDROCHLORIDE 100 MG: 20 INJECTION, SOLUTION INFILTRATION; PERINEURAL at 13:01
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
## 2022-08-25 NOTE — ANESTHESIA POSTPROCEDURE EVALUATION
Patient: Clint Alvarez    Procedure: Procedure(s):  COLONOSCOPY, WITH POLYPECTOMY       Anesthesia Type:  MAC    Note:  Disposition: Outpatient   Postop Pain Control: Uneventful            Sign Out: Well controlled pain   PONV: No   Neuro/Psych: Uneventful            Sign Out: Acceptable/Baseline neuro status   Airway/Respiratory: Uneventful            Sign Out: Acceptable/Baseline resp. status   CV/Hemodynamics: Uneventful            Sign Out: Acceptable CV status; No obvious hypovolemia; No obvious fluid overload   Other NRE: NONE   DID A NON-ROUTINE EVENT OCCUR? No           Last vitals:  Vitals:    08/25/22 1205   BP: (!) 118/95   Pulse: 65   Resp: 18   Temp: 36.7  C (98  F)   SpO2: 96%       Electronically Signed By: Jose R Chopra MD, MD  August 25, 2022  1:25 PM

## 2022-08-25 NOTE — DISCHARGE INSTRUCTIONS
Discharge Instructions after Colonoscopy  or Sigmoidoscopy    Today you had a ____ Colonoscopy ____ Sigmoidoscopy    Activity and Diet  You were given medicine for pain. You may be dizzy or sleepy.  For 24 hours:   Do not drive or use heavy equipment.   Do not make important decisions.   Do not drink any alcohol.  You may return to your normal diet and medicines.    Discomfort   Air was placed in your colon during the exam in order to see it. Walking helps to pass the air.   You may take Tylenol (acetaminophen) for pain unless your doctor has told you not to.  Do not take aspirin or ibuprofen (Advil, Motrin, or other anti-inflammatory  drugs) for _____ days.    Follow-up  ____ We took small tissue samples or polyps to study. Your doctor will call you with the results  within two weeks.    When to call:    Call right away if you have:   Unusual pain in belly or chest pain not relieved with passing air.   More than 1 to 2 Tablespoons of bleeding from your rectum.   Fever above 100.6  F (37.5  C).    If you have severe pain, bleeding, or shortness of breath, go to an emergency room.    If you have questions, call:  Monday to Friday, 8 a.m. to 4:30 p.m.  Central Scheduling Department: [phone]    After hours  [facility]: [phone] (Ask for the GI fellow on call)

## 2022-08-25 NOTE — ANESTHESIA CARE TRANSFER NOTE
Patient: Clint Alvarez    Procedure: Procedure(s):  COLONOSCOPY, WITH POLYPECTOMY       Diagnosis: Encounter for general adult medical examination without abnormal findings [Z00.00]  Diagnosis Additional Information: No value filed.    Anesthesia Type:   MAC     Note:    Oropharynx: oropharynx clear of all foreign objects  Level of Consciousness: awake and drowsy  Oxygen Supplementation: nasal cannula  Level of Supplemental Oxygen (L/min / FiO2): 2  Independent Airway: airway patency satisfactory and stable  Dentition: dentition unchanged  Vital Signs Stable: post-procedure vital signs reviewed and stable  Report to RN Given: handoff report given  Patient transferred to: Phase II  Comments: VSS and WNL, comfortable, no PONV, report to Bella RN  Handoff Report: Identifed the Patient, Identified the Reponsible Provider, Reviewed the pertinent medical history, Discussed the surgical course, Reviewed Intra-OP anesthesia mangement and issues during anesthesia, Set expectations for post-procedure period and Allowed opportunity for questions and acknowledgement of understanding      Vitals:  Vitals Value Taken Time   BP     Temp     Pulse     Resp     SpO2         Electronically Signed By: CAITLIN Mayen CRNA  August 25, 2022  1:30 PM

## 2022-08-26 LAB
PATH REPORT.COMMENTS IMP SPEC: NORMAL
PATH REPORT.COMMENTS IMP SPEC: NORMAL
PATH REPORT.FINAL DX SPEC: NORMAL
PATH REPORT.GROSS SPEC: NORMAL
PATH REPORT.MICROSCOPIC SPEC OTHER STN: NORMAL
PATH REPORT.RELEVANT HX SPEC: NORMAL
PHOTO IMAGE: NORMAL

## 2022-08-26 PROCEDURE — 88305 TISSUE EXAM BY PATHOLOGIST: CPT | Mod: 26 | Performed by: PATHOLOGY

## 2022-10-10 ENCOUNTER — HEALTH MAINTENANCE LETTER (OUTPATIENT)
Age: 47
End: 2022-10-10

## 2022-10-14 ENCOUNTER — TRANSFERRED RECORDS (OUTPATIENT)
Dept: HEALTH INFORMATION MANAGEMENT | Facility: CLINIC | Age: 47
End: 2022-10-14

## 2023-03-25 ENCOUNTER — HEALTH MAINTENANCE LETTER (OUTPATIENT)
Age: 48
End: 2023-03-25

## 2023-07-21 LAB
ALBUMIN (URINE) MG/SPEC: 0.4 MG/DL
ALT SERPL-CCNC: 33 U/L (ref 16–63)
AST SERPL-CCNC: 15 U/L (ref 0–55)
CHOLESTEROL (EXTERNAL): 201 MG/DL
CREATININE (EXTERNAL): 1.14 MG/DL (ref 0.7–1.3)
CREATININE (URINE): 203 MG/DL (ref 20–320)
GFR ESTIMATED (EXTERNAL): >60 ML/MIN/1.73M2
GLUCOSE (EXTERNAL): 93 MG/DL (ref 70–124)
HBA1C MFR BLD: 6.2 % (ref 4.3–5.6)
HDLC SERPL-MCNC: 40 MG/DL (ref 40–999)
LDL CHOLESTEROL CALCULATED (EXTERNAL): 105 MG/DL (ref 0–130)
POTASSIUM (EXTERNAL): 4.7 MMOL/L (ref 3.4–5.3)
TRIGLYCERIDES (EXTERNAL): 281 MG/DL (ref 20–150)
TSH SERPL-ACNC: 1.96 MIU/L (ref 0.4–4.5)

## 2023-07-24 ENCOUNTER — TRANSCRIBE ORDERS (OUTPATIENT)
Dept: OTHER | Age: 48
End: 2023-07-24

## 2023-07-24 DIAGNOSIS — R73.03 PRE-DIABETES: ICD-10-CM

## 2023-07-24 DIAGNOSIS — E66.9 OBESITY, UNSPECIFIED: Primary | ICD-10-CM

## 2023-07-28 ENCOUNTER — TRANSFERRED RECORDS (OUTPATIENT)
Dept: HEALTH INFORMATION MANAGEMENT | Facility: CLINIC | Age: 48
End: 2023-07-28

## 2023-07-31 ENCOUNTER — TRANSCRIBE ORDERS (OUTPATIENT)
Dept: OTHER | Age: 48
End: 2023-07-31

## 2023-07-31 DIAGNOSIS — R79.89 LOW TESTOSTERONE IN MALE: Primary | ICD-10-CM

## 2023-10-04 ENCOUNTER — TRANSFERRED RECORDS (OUTPATIENT)
Dept: HEALTH INFORMATION MANAGEMENT | Facility: CLINIC | Age: 48
End: 2023-10-04

## 2023-10-27 ENCOUNTER — APPOINTMENT (OUTPATIENT)
Dept: CT IMAGING | Facility: CLINIC | Age: 48
End: 2023-10-27
Attending: EMERGENCY MEDICINE
Payer: COMMERCIAL

## 2023-10-27 ENCOUNTER — HOSPITAL ENCOUNTER (INPATIENT)
Facility: CLINIC | Age: 48
LOS: 1 days | Discharge: HOME | End: 2023-10-27
Attending: EMERGENCY MEDICINE | Admitting: INTERNAL MEDICINE
Payer: COMMERCIAL

## 2023-10-27 VITALS
SYSTOLIC BLOOD PRESSURE: 142 MMHG | HEART RATE: 70 BPM | OXYGEN SATURATION: 99 % | RESPIRATION RATE: 18 BRPM | DIASTOLIC BLOOD PRESSURE: 95 MMHG | TEMPERATURE: 97.9 F

## 2023-10-27 DIAGNOSIS — N20.1 URETEROLITHIASIS: Primary | ICD-10-CM

## 2023-10-27 DIAGNOSIS — K85.90 ACUTE PANCREATITIS, UNSPECIFIED COMPLICATION STATUS, UNSPECIFIED PANCREATITIS TYPE: ICD-10-CM

## 2023-10-27 LAB
ALBUMIN SERPL BCG-MCNC: 4.4 G/DL (ref 3.5–5.2)
ALBUMIN UR-MCNC: NEGATIVE MG/DL
ALP SERPL-CCNC: 87 U/L (ref 40–129)
ALT SERPL W P-5'-P-CCNC: 20 U/L (ref 0–70)
ANION GAP SERPL CALCULATED.3IONS-SCNC: 13 MMOL/L (ref 7–15)
APPEARANCE UR: CLEAR
AST SERPL W P-5'-P-CCNC: 12 U/L (ref 0–45)
BASOPHILS # BLD AUTO: 0.1 10E3/UL (ref 0–0.2)
BASOPHILS NFR BLD AUTO: 1 %
BILIRUB SERPL-MCNC: 0.4 MG/DL
BILIRUB UR QL STRIP: NEGATIVE
BUN SERPL-MCNC: 11.1 MG/DL (ref 6–20)
CALCIUM SERPL-MCNC: 9.3 MG/DL (ref 8.6–10)
CHLORIDE SERPL-SCNC: 103 MMOL/L (ref 98–107)
COLOR UR AUTO: ABNORMAL
CREAT SERPL-MCNC: 1.19 MG/DL (ref 0.67–1.17)
DEPRECATED HCO3 PLAS-SCNC: 22 MMOL/L (ref 22–29)
EGFRCR SERPLBLD CKD-EPI 2021: 74 ML/MIN/1.73M2
EOSINOPHIL # BLD AUTO: 0.1 10E3/UL (ref 0–0.7)
EOSINOPHIL NFR BLD AUTO: 1 %
ERYTHROCYTE [DISTWIDTH] IN BLOOD BY AUTOMATED COUNT: 12.3 % (ref 10–15)
GLUCOSE SERPL-MCNC: 126 MG/DL (ref 70–99)
GLUCOSE UR STRIP-MCNC: NEGATIVE MG/DL
HCT VFR BLD AUTO: 47.7 % (ref 40–53)
HGB BLD-MCNC: 16.5 G/DL (ref 13.3–17.7)
HGB UR QL STRIP: ABNORMAL
HOLD SPECIMEN: NORMAL
IMM GRANULOCYTES # BLD: 0 10E3/UL
IMM GRANULOCYTES NFR BLD: 0 %
KETONES UR STRIP-MCNC: NEGATIVE MG/DL
LEUKOCYTE ESTERASE UR QL STRIP: NEGATIVE
LIPASE SERPL-CCNC: 180 U/L (ref 13–60)
LYMPHOCYTES # BLD AUTO: 2.5 10E3/UL (ref 0.8–5.3)
LYMPHOCYTES NFR BLD AUTO: 28 %
MCH RBC QN AUTO: 29 PG (ref 26.5–33)
MCHC RBC AUTO-ENTMCNC: 34.6 G/DL (ref 31.5–36.5)
MCV RBC AUTO: 84 FL (ref 78–100)
MONOCYTES # BLD AUTO: 0.6 10E3/UL (ref 0–1.3)
MONOCYTES NFR BLD AUTO: 7 %
MUCOUS THREADS #/AREA URNS LPF: PRESENT /LPF
NEUTROPHILS # BLD AUTO: 5.6 10E3/UL (ref 1.6–8.3)
NEUTROPHILS NFR BLD AUTO: 63 %
NITRATE UR QL: NEGATIVE
NRBC # BLD AUTO: 0 10E3/UL
NRBC BLD AUTO-RTO: 0 /100
PH UR STRIP: 5 [PH] (ref 5–7)
PLATELET # BLD AUTO: 273 10E3/UL (ref 150–450)
POTASSIUM SERPL-SCNC: 4 MMOL/L (ref 3.4–5.3)
PROT SERPL-MCNC: 7.6 G/DL (ref 6.4–8.3)
RBC # BLD AUTO: 5.69 10E6/UL (ref 4.4–5.9)
RBC URINE: 64 /HPF
SODIUM SERPL-SCNC: 138 MMOL/L (ref 135–145)
SP GR UR STRIP: 1.02 (ref 1–1.03)
SQUAMOUS EPITHELIAL: <1 /HPF
TRANSITIONAL EPI: <1 /HPF
UROBILINOGEN UR STRIP-MCNC: NORMAL MG/DL
WBC # BLD AUTO: 8.9 10E3/UL (ref 4–11)
WBC URINE: 2 /HPF

## 2023-10-27 PROCEDURE — 74176 CT ABD & PELVIS W/O CONTRAST: CPT | Mod: 26 | Performed by: STUDENT IN AN ORGANIZED HEALTH CARE EDUCATION/TRAINING PROGRAM

## 2023-10-27 PROCEDURE — 85025 COMPLETE CBC W/AUTO DIFF WBC: CPT | Performed by: EMERGENCY MEDICINE

## 2023-10-27 PROCEDURE — 81001 URINALYSIS AUTO W/SCOPE: CPT | Performed by: EMERGENCY MEDICINE

## 2023-10-27 PROCEDURE — 120N000002 HC R&B MED SURG/OB UMMC

## 2023-10-27 PROCEDURE — 83690 ASSAY OF LIPASE: CPT | Performed by: EMERGENCY MEDICINE

## 2023-10-27 PROCEDURE — 250N000011 HC RX IP 250 OP 636: Mod: JZ | Performed by: EMERGENCY MEDICINE

## 2023-10-27 PROCEDURE — 99285 EMERGENCY DEPT VISIT HI MDM: CPT | Performed by: EMERGENCY MEDICINE

## 2023-10-27 PROCEDURE — 96374 THER/PROPH/DIAG INJ IV PUSH: CPT | Performed by: EMERGENCY MEDICINE

## 2023-10-27 PROCEDURE — 258N000003 HC RX IP 258 OP 636: Performed by: EMERGENCY MEDICINE

## 2023-10-27 PROCEDURE — 80053 COMPREHEN METABOLIC PANEL: CPT | Performed by: EMERGENCY MEDICINE

## 2023-10-27 PROCEDURE — 250N000013 HC RX MED GY IP 250 OP 250 PS 637: Performed by: PHYSICIAN ASSISTANT

## 2023-10-27 PROCEDURE — 96361 HYDRATE IV INFUSION ADD-ON: CPT | Performed by: EMERGENCY MEDICINE

## 2023-10-27 PROCEDURE — 250N000011 HC RX IP 250 OP 636: Mod: JZ | Performed by: INTERNAL MEDICINE

## 2023-10-27 PROCEDURE — 99253 IP/OBS CNSLTJ NEW/EST LOW 45: CPT | Performed by: PHYSICIAN ASSISTANT

## 2023-10-27 PROCEDURE — 36415 COLL VENOUS BLD VENIPUNCTURE: CPT | Performed by: EMERGENCY MEDICINE

## 2023-10-27 PROCEDURE — 250N000011 HC RX IP 250 OP 636: Mod: JZ

## 2023-10-27 PROCEDURE — 96375 TX/PRO/DX INJ NEW DRUG ADDON: CPT | Performed by: EMERGENCY MEDICINE

## 2023-10-27 PROCEDURE — 250N000013 HC RX MED GY IP 250 OP 250 PS 637

## 2023-10-27 PROCEDURE — 74176 CT ABD & PELVIS W/O CONTRAST: CPT

## 2023-10-27 PROCEDURE — 99207 PR NO BILLABLE SERVICE THIS VISIT: CPT | Performed by: INTERNAL MEDICINE

## 2023-10-27 PROCEDURE — 99236 HOSP IP/OBS SAME DATE HI 85: CPT | Performed by: INTERNAL MEDICINE

## 2023-10-27 PROCEDURE — 99285 EMERGENCY DEPT VISIT HI MDM: CPT | Mod: 25 | Performed by: EMERGENCY MEDICINE

## 2023-10-27 RX ORDER — DIMENHYDRINATE 50 MG
50 TABLET ORAL
COMMUNITY
Start: 2023-10-27

## 2023-10-27 RX ORDER — ONDANSETRON 4 MG/1
4 TABLET, ORALLY DISINTEGRATING ORAL EVERY 8 HOURS PRN
Qty: 12 TABLET | Refills: 1 | Status: SHIPPED | OUTPATIENT
Start: 2023-10-27

## 2023-10-27 RX ORDER — TAMSULOSIN HYDROCHLORIDE 0.4 MG/1
0.4 CAPSULE ORAL DAILY
Qty: 45 CAPSULE | Refills: 0 | Status: SHIPPED | OUTPATIENT
Start: 2023-10-27 | End: 2023-11-16

## 2023-10-27 RX ORDER — TAMSULOSIN HYDROCHLORIDE 0.4 MG/1
0.4 CAPSULE ORAL DAILY
Status: DISCONTINUED | OUTPATIENT
Start: 2023-10-27 | End: 2023-10-27 | Stop reason: HOSPADM

## 2023-10-27 RX ORDER — ACETAMINOPHEN 325 MG/1
975 TABLET ORAL EVERY 8 HOURS
Status: DISCONTINUED | OUTPATIENT
Start: 2023-10-27 | End: 2023-10-27 | Stop reason: HOSPADM

## 2023-10-27 RX ORDER — ACETAMINOPHEN 325 MG/1
975 TABLET ORAL EVERY 8 HOURS PRN
COMMUNITY
Start: 2023-10-27 | End: 2023-10-30

## 2023-10-27 RX ORDER — METFORMIN HCL 500 MG
1000 TABLET, EXTENDED RELEASE 24 HR ORAL EVERY MORNING
COMMUNITY
Start: 2023-10-24 | End: 2023-11-16

## 2023-10-27 RX ORDER — VITAMIN B COMPLEX
50 TABLET ORAL DAILY
Status: DISCONTINUED | OUTPATIENT
Start: 2023-10-27 | End: 2023-10-27 | Stop reason: HOSPADM

## 2023-10-27 RX ORDER — KETOROLAC TROMETHAMINE 10 MG/1
10 TABLET, FILM COATED ORAL EVERY 6 HOURS PRN
Qty: 20 TABLET | Refills: 0 | Status: SHIPPED | OUTPATIENT
Start: 2023-10-27 | End: 2024-07-16

## 2023-10-27 RX ORDER — DEXTROSE, SODIUM CHLORIDE, SODIUM LACTATE, POTASSIUM CHLORIDE, AND CALCIUM CHLORIDE 5; .6; .31; .03; .02 G/100ML; G/100ML; G/100ML; G/100ML; G/100ML
INJECTION, SOLUTION INTRAVENOUS CONTINUOUS
Status: DISCONTINUED | OUTPATIENT
Start: 2023-10-27 | End: 2023-10-27 | Stop reason: HOSPADM

## 2023-10-27 RX ORDER — HYDROMORPHONE HCL IN WATER/PF 6 MG/30 ML
0.4 PATIENT CONTROLLED ANALGESIA SYRINGE INTRAVENOUS
Status: DISCONTINUED | OUTPATIENT
Start: 2023-10-27 | End: 2023-10-27 | Stop reason: HOSPADM

## 2023-10-27 RX ORDER — ONDANSETRON 2 MG/ML
4 INJECTION INTRAMUSCULAR; INTRAVENOUS EVERY 30 MIN PRN
Status: DISCONTINUED | OUTPATIENT
Start: 2023-10-27 | End: 2023-10-27 | Stop reason: HOSPADM

## 2023-10-27 RX ORDER — KETOROLAC TROMETHAMINE 15 MG/ML
15 INJECTION, SOLUTION INTRAMUSCULAR; INTRAVENOUS ONCE
Status: COMPLETED | OUTPATIENT
Start: 2023-10-27 | End: 2023-10-27

## 2023-10-27 RX ORDER — KETOROLAC TROMETHAMINE 15 MG/ML
15 INJECTION, SOLUTION INTRAMUSCULAR; INTRAVENOUS EVERY 6 HOURS
Qty: 12 ML | Refills: 0 | Status: DISCONTINUED | OUTPATIENT
Start: 2023-10-27 | End: 2023-10-27 | Stop reason: HOSPADM

## 2023-10-27 RX ORDER — HYDROMORPHONE HYDROCHLORIDE 1 MG/ML
0.5 INJECTION, SOLUTION INTRAMUSCULAR; INTRAVENOUS; SUBCUTANEOUS EVERY 30 MIN PRN
Status: DISCONTINUED | OUTPATIENT
Start: 2023-10-27 | End: 2023-10-27

## 2023-10-27 RX ADMIN — ACETAMINOPHEN 975 MG: 325 TABLET, FILM COATED ORAL at 12:57

## 2023-10-27 RX ADMIN — KETOROLAC TROMETHAMINE 15 MG: 15 INJECTION, SOLUTION INTRAMUSCULAR; INTRAVENOUS at 16:42

## 2023-10-27 RX ADMIN — KETOROLAC TROMETHAMINE 15 MG: 15 INJECTION, SOLUTION INTRAMUSCULAR; INTRAVENOUS at 08:12

## 2023-10-27 RX ADMIN — HYDROMORPHONE HYDROCHLORIDE 0.4 MG: 0.2 INJECTION, SOLUTION INTRAMUSCULAR; INTRAVENOUS; SUBCUTANEOUS at 12:57

## 2023-10-27 RX ADMIN — ONDANSETRON 4 MG: 2 INJECTION INTRAMUSCULAR; INTRAVENOUS at 08:12

## 2023-10-27 RX ADMIN — SODIUM CHLORIDE 1000 ML: 9 INJECTION, SOLUTION INTRAVENOUS at 08:13

## 2023-10-27 RX ADMIN — SODIUM CHLORIDE, SODIUM LACTATE, POTASSIUM CHLORIDE, CALCIUM CHLORIDE AND DEXTROSE MONOHYDRATE: 5; 600; 310; 30; 20 INJECTION, SOLUTION INTRAVENOUS at 12:58

## 2023-10-27 RX ADMIN — HYDROMORPHONE HYDROCHLORIDE 0.5 MG: 1 INJECTION, SOLUTION INTRAMUSCULAR; INTRAVENOUS; SUBCUTANEOUS at 11:04

## 2023-10-27 RX ADMIN — TAMSULOSIN HYDROCHLORIDE 0.4 MG: 0.4 CAPSULE ORAL at 16:41

## 2023-10-27 RX ADMIN — Medication 50 MCG: at 12:56

## 2023-10-27 ASSESSMENT — ACTIVITIES OF DAILY LIVING (ADL)
ADLS_ACUITY_SCORE: 35
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
## 2023-10-27 NOTE — MEDICATION SCRIBE - ADMISSION MEDICATION HISTORY
Medication Scribe Admission Medication History    Admission medication history is complete. The information provided in this note is only as accurate as the sources available at the time of the update.    Information Source(s): Patient via in-person    Pertinent Information: NOne    Changes made to PTA medication list:  Added: Metformin 500 mg  Deleted: Acetaminophen 500 mg, Bisacodyl 5 mg, Lotrisone 1-0.05%, Fluconazole 150 mg, Meloxicam 15 mg, Naproxen, Niacin 100 mg, Golytely 236 G, vitamin D  Changed: None    Medication Affordability:  Not including over the counter (OTC) medications, was there a time in the past 3 months when you did not take your medications as prescribed because of cost?: No    Allergies reviewed with patient and updates made in EHR: yes    Medication History Completed By: Riana Posada 10/27/2023 3:53 PM    PTA Med List   Medication Sig Last Dose    aspirin 81 MG EC tablet Take 81 mg by mouth daily Past Week at unknown    metFORMIN (GLUCOPHAGE XR) 500 MG 24 hr tablet Take 1,000 mg by mouth every morning 10/26/2023 at am    order for DME Equipment being ordered: patellar stabilization knee brace with horseshoe, XL
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
## 2023-10-27 NOTE — DISCHARGE SUMMARY
"[facility]  Discharge Summary - Medicine & Pediatrics       Date of Admission:  10/27/2023  Date of Discharge:  10/27/2023  Discharging Provider: Dr. Xiao  Discharge Service: Medicine ServiceMIS TEAM 5    Discharge Diagnoses   Ureteral stone and associated left hydronephrosis     Clinically Significant Risk Factors          Follow-ups Needed After Discharge   Follow-up Appointments     Adult [facility] Follow-up and recommended labs and tests      UROLOGY Follow-Up:   - Urology will be contacting you to schedule outpatient followup. Recommend repeat Chem during outpatient visit  - Call or return sooner than your regularly scheduled visit if you develop   any of the following:  Fever (greater than 101.3F), chills, uncontrolled   pain, uncontrolled nausea or vomiting, or inability to pass urine.     Phone numbers:   - Monday through Friday 8am to 4:30pm: Call [phone] with questions,   requests for medication refills, or to schedule or confirm an appointment.  - Nights, weekends, or holidays: call the after hours emergency pager -   [phone] and tell the  \"I would like to page the Urology   Resident on call.\" Typically, the on-call provider should return your call   within 30 minutes.  Please page the on-call provider again if you haven't   been contacted as expected.  Rarely, the on-call provider will be unable   to promptly return a call due to a hospital emergency.  If you have paged   twice and are still not contacted, ask the hospital  to page the   \"urology CHIEF-RESIDENT on call\".   - Please note that due to prescribing laws, resident physicians are unable   to prescribe narcotics after-hours. If you feel as though you will need a   refill of a narcotic pain medication, you will need to call the clinic   during business hours OR seek emergency care.  - For emergencies, call 911      Appointments on [place] and/or [facility] (with " [facility] or [facility]   provider or service). Call [phone] if you haven't heard regarding   these appointments within 7 days of discharge.        Follow Up ([facility])      Follow up with Urology early next week. Continue to strain urine and   Bring stone to urology visit if found.     Appointments on [place] and/or [facility] (with [facility] or [facility]   provider or service). Call [phone] if you haven't heard regarding   these appointments within 7 days of discharge.              Discharge Disposition   Discharged to home  Condition at discharge: Stable    Hospital Course   Clint Alvarez was admitted on 10/27/2023 for flank pain. He is a 48 year old male with Hx gallstone pancreatitis s/p cholecystectomy and Hx nephrolithiasis (1x spontaneous stone passage 20 years ago)  The following problems were addressed during his hospitalization:    #Ureterolithiasis with Hydronephrosis  CT demonstrated  a 6mm distal ureteral stone and associated left hydronephrosis.  Clint was initially placed on fluids and pain was managed with IV medications.   Urology was consulted. Patient preferred to try and pass the stone at home.  At time of discharge pain was well controlled, and patient did not want oral opiates for home use. He was able to tolerate fluids.   Urology is scheduling follow up early next week. Recommend repeat Chem on outpatient visit  Return precautions given.     #Elevated Lipase  Symptoms not consistent with pancreatitis. Only mildly elevated, exactly 3x the upper limit of normal. Can be rechecked as an outpatient.     Consultations This Hospital Stay   UROLOGY IP CONSULT    Code Status   Full Code       The patient was discussed with Dr. Jose Vogel MD  [facility] EMERGENCY DEPARTMENT  [address] [address]  Phone: [phone]  ______________________________________________________________________    Physical Exam   Vital Signs: Temp: 97.9  F (36.6  C)    "BP: (!) 155/105 Pulse: 66   Resp: 20 SpO2: 98 % O2 Device: None (Room air)    Weight: 0 lbs 0 oz  Physical Exam  Constitutional:       Appearance: Normal appearance. He is obese.   HENT:      Mouth/Throat:      Mouth: Mucous membranes are moist.   Eyes:      Extraocular Movements: Extraocular movements intact.   Cardiovascular:      Rate and Rhythm: Normal rate and regular rhythm.      Pulses: Normal pulses.      Heart sounds: Normal heart sounds.   Pulmonary:      Effort: Pulmonary effort is normal.      Breath sounds: Normal breath sounds.   Abdominal:      General: Bowel sounds are normal.      Palpations: Abdomen is soft.      Tenderness: There is left CVA tenderness. There is no guarding or rebound.   Skin:     General: Skin is warm.   Neurological:      General: No focal deficit present.      Mental Status: He is alert and oriented to person, place, and time. Mental status is at baseline.      Gait: Gait normal.   Psychiatric:         Mood and Affect: Mood normal.         Behavior: Behavior normal.         Thought Content: Thought content normal.         Judgment: Judgment normal.             Primary Care Physician   Israel Matthews    Discharge Orders      Adult [facility] Follow-up and recommended labs and tests    UROLOGY Follow-Up:   - Urology will be contacting you to schedule outpatient followup  - Call or return sooner than your regularly scheduled visit if you develop any of the following:  Fever (greater than 101.3F), chills, uncontrolled pain, uncontrolled nausea or vomiting, or inability to pass urine.     Phone numbers:   - Monday through Friday 8am to 4:30pm: Call [phone] with questions, requests for medication refills, or to schedule or confirm an appointment.  - Nights, weekends, or holidays: call the after hours emergency pager - [phone] and tell the  \"I would like to page the Urology Resident on call.\" Typically, the on-call provider should return your call within 30 minutes. " " Please page the on-call provider again if you haven't been contacted as expected.  Rarely, the on-call provider will be unable to promptly return a call due to a hospital emergency.  If you have paged twice and are still not contacted, ask the hospital  to page the \"urology CHIEF-RESIDENT on call\".   - Please note that due to prescribing laws, resident physicians are unable to prescribe narcotics after-hours. If you feel as though you will need a refill of a narcotic pain medication, you will need to call the clinic during business hours OR seek emergency care.  - For emergencies, call 911      Appointments on [place] and/or [facility] (with [facility] or [facility] provider or service). Call [phone] if you haven't heard regarding these appointments within 7 days of discharge.     Reason for your hospital stay    Kidney Stone     Activity    Your activity upon discharge: activity as tolerated     Follow Up ([facility])    Follow up with Urology early next week. Continue to strain urine and bristone to urology if found.     Appointments on [place] and/or [facility] (with [facility] or [facility] provider or service). Call [phone] if you haven't heard regarding these appointments within 7 days of discharge.     Diet    Follow this diet upon discharge: Regular       Significant Results and Procedures       Results for orders placed or performed during the hospital encounter of 10/27/23 (from the past 24 hour(s))   [place] Draw    Narrative    The following orders were created for panel order [place] Draw.  Procedure                               Abnormality         Status                     ---------                               -----------         ------                     Extra Blue Top Tube[[id number]]                              Final result               Extra Green Top (Lithium...[[id number]]                      Final result               Extra Purple Top Tube[[id number]]                            " Final result                 Please view results for these tests on the individual orders.   Extra Blue Top Tube   Result Value Ref Range    Hold Specimen JIC    Extra Green Top (Lithium Heparin) Tube   Result Value Ref Range    Hold Specimen JIC    Extra Purple Top Tube   Result Value Ref Range    Hold Specimen JIC    Comprehensive metabolic panel   Result Value Ref Range    Sodium 138 135 - 145 mmol/L    Potassium 4.0 3.4 - 5.3 mmol/L    Carbon Dioxide (CO2) 22 22 - 29 mmol/L    Anion Gap 13 7 - 15 mmol/L    Urea Nitrogen 11.1 6.0 - 20.0 mg/dL    Creatinine 1.19 (H) 0.67 - 1.17 mg/dL    GFR Estimate 74 >60 mL/min/1.73m2    Calcium 9.3 8.6 - 10.0 mg/dL    Chloride 103 98 - 107 mmol/L    Glucose 126 (H) 70 - 99 mg/dL    Alkaline Phosphatase 87 40 - 129 U/L    AST 12 0 - 45 U/L    ALT 20 0 - 70 U/L    Protein Total 7.6 6.4 - 8.3 g/dL    Albumin 4.4 3.5 - 5.2 g/dL    Bilirubin Total 0.4 <=1.2 mg/dL   CBC with platelets differential    Narrative    The following orders were created for panel order CBC with platelets differential.  Procedure                               Abnormality         Status                     ---------                               -----------         ------                     CBC with platelets and d...[[id number]]                      Final result                 Please view results for these tests on the individual orders.   Lipase   Result Value Ref Range    Lipase 180 (H) 13 - 60 U/L   CBC with platelets and differential   Result Value Ref Range    WBC Count 8.9 4.0 - 11.0 10e3/uL    RBC Count 5.69 4.40 - 5.90 10e6/uL    Hemoglobin 16.5 13.3 - 17.7 g/dL    Hematocrit 47.7 40.0 - 53.0 %    MCV 84 78 - 100 fL    MCH 29.0 26.5 - 33.0 pg    MCHC 34.6 31.5 - 36.5 g/dL    RDW 12.3 10.0 - 15.0 %    Platelet Count 273 150 - 450 10e3/uL    % Neutrophils 63 %    % Lymphocytes 28 %    % Monocytes 7 %    % Eosinophils 1 %    % Basophils 1 %    % Immature Granulocytes 0 %    NRBCs per 100 WBC 0 <1 /100     Absolute Neutrophils 5.6 1.6 - 8.3 10e3/uL    Absolute Lymphocytes 2.5 0.8 - 5.3 10e3/uL    Absolute Monocytes 0.6 0.0 - 1.3 10e3/uL    Absolute Eosinophils 0.1 0.0 - 0.7 10e3/uL    Absolute Basophils 0.1 0.0 - 0.2 10e3/uL    Absolute Immature Granulocytes 0.0 <=0.4 10e3/uL    Absolute NRBCs 0.0 10e3/uL   CT Abdomen Pelvis w/o Contrast    Narrative    EXAMINATION: CT ABDOMEN PELVIS W/O CONTRAST, 10/27/2023 8:55 AM    INDICATION: L flank pain    COMPARISON STUDY: CT abdomen and pelvis 4/1/2019    TECHNIQUE: CT scan of the abdomen and pelvis was performed on  multidetector CT scanner using volumetric acquisition technique and  images were reconstructed in multiple planes with variable thickness  and reviewed on dedicated workstations.     CONTRAST: No contrast    CT scan radiation dose is optimized to minimum requisite dose using  automated dose modulation techniques.    FINDINGS:    Lower thorax: No focal pulmonary opacities. Heart size is within  normal limits. No pericardial effusion.  Calcified right lower lung  nodules and small right hilar lymph nodes.    Liver: No mass. No intrahepatic biliary ductal dilation.    Biliary System: Status post cholecystectomy. No extrahepatic biliary  dilatation.    Pancreas: No mass or pancreatic ductal dilation.    Adrenal glands: No mass or nodules    Spleen: Normal.    Kidneys: Mild left hydronephrosis, with surrounding fat stranding.  There is a 6 mm calculus within the mid to distal left ureter (series  5, image 210).  Bilateral parapelvic cysts. At least 2 nonobstructive  right renal calculi, largest measuring 3 mm.    Gastrointestinal tract: Normal appendix. Normal caliber small bowel.   Diverticulosis without evidence of diverticulitis.    Mesentery/peritoneum/retroperitoneum: No mass. No free fluid or air.    Lymph nodes: No significant lymphadenopathy.    Vasculature: Nonaneurysmal aorta.    Pelvis: Urinary bladder is normal.  Prostate and seminal vesicles  are  within normal limits.    Osseous structures: No aggressive or acute osseous lesion.      Soft tissues: Within normal limits.      Impression    IMPRESSION:   1. Renal calculus within the left mid to distal ureter with associated  mild hydronephrosis and inflammation. No evidence of abscess.  2. Right nephrolithiasis without hydronephrosis.    I have personally reviewed the examination and initial interpretation  and I agree with the findings.    PATRICIA OLIVAS DO         SYSTEM ID:  [id number]   UA with Microscopic reflex to Culture    Specimen: Urine, Midstream   Result Value Ref Range    Color Urine Light Yellow Colorless, Straw, Light Yellow, Yellow    Appearance Urine Clear Clear    Glucose Urine Negative Negative mg/dL    Bilirubin Urine Negative Negative    Ketones Urine Negative Negative mg/dL    Specific Gravity Urine 1.019 1.003 - 1.035    Blood Urine Large (A) Negative    pH Urine 5.0 5.0 - 7.0    Protein Albumin Urine Negative Negative mg/dL    Urobilinogen Urine Normal Normal, 2.0 mg/dL    Nitrite Urine Negative Negative    Leukocyte Esterase Urine Negative Negative    Mucus Urine Present (A) None Seen /LPF    RBC Urine 64 (H) <=2 /HPF    WBC Urine 2 <=5 /HPF    Squamous Epithelials Urine <1 <=1 /HPF    Transitional Epithelials Urine <1 <=1 /HPF    Narrative    Urine Culture not indicated        Results for orders placed or performed during the hospital encounter of 10/27/23   CT Abdomen Pelvis w/o Contrast    Narrative    EXAMINATION: CT ABDOMEN PELVIS W/O CONTRAST, 10/27/2023 8:55 AM    INDICATION: L flank pain    COMPARISON STUDY: CT abdomen and pelvis 4/1/2019    TECHNIQUE: CT scan of the abdomen and pelvis was performed on  multidetector CT scanner using volumetric acquisition technique and  images were reconstructed in multiple planes with variable thickness  and reviewed on dedicated workstations.     CONTRAST: No contrast    CT scan radiation dose is optimized to minimum requisite dose  using  automated dose modulation techniques.    FINDINGS:    Lower thorax: No focal pulmonary opacities. Heart size is within  normal limits. No pericardial effusion.  Calcified right lower lung  nodules and small right hilar lymph nodes.    Liver: No mass. No intrahepatic biliary ductal dilation.    Biliary System: Status post cholecystectomy. No extrahepatic biliary  dilatation.    Pancreas: No mass or pancreatic ductal dilation.    Adrenal glands: No mass or nodules    Spleen: Normal.    Kidneys: Mild left hydronephrosis, with surrounding fat stranding.  There is a 6 mm calculus within the mid to distal left ureter (series  5, image 210).  Bilateral parapelvic cysts. At least 2 nonobstructive  right renal calculi, largest measuring 3 mm.    Gastrointestinal tract: Normal appendix. Normal caliber small bowel.   Diverticulosis without evidence of diverticulitis.    Mesentery/peritoneum/retroperitoneum: No mass. No free fluid or air.    Lymph nodes: No significant lymphadenopathy.    Vasculature: Nonaneurysmal aorta.    Pelvis: Urinary bladder is normal.  Prostate and seminal vesicles are  within normal limits.    Osseous structures: No aggressive or acute osseous lesion.      Soft tissues: Within normal limits.      Impression    IMPRESSION:   1. Renal calculus within the left mid to distal ureter with associated  mild hydronephrosis and inflammation. No evidence of abscess.  2. Right nephrolithiasis without hydronephrosis.    I have personally reviewed the examination and initial interpretation  and I agree with the findings.    PATRICIA OLIVAS DO         SYSTEM ID:  [id number]       Discharge Medications   Current Discharge Medication List        START taking these medications    Details   acetaminophen (TYLENOL) 325 MG tablet Take 3 tablets (975 mg) by mouth every 8 hours as needed for mild pain    Associated Diagnoses: Ureterolithiasis      dimenhyDRINATE (DRAMAMINE) 50 MG tablet Take 1 tablet (50 mg) by mouth  nightly as needed for sleep    Associated Diagnoses: Ureterolithiasis      ketorolac (TORADOL) 10 MG tablet Take 1 tablet (10 mg) by mouth every 6 hours as needed for moderate pain  Qty: 20 tablet, Refills: 0    Associated Diagnoses: Ureterolithiasis      ondansetron (ZOFRAN ODT) 4 MG ODT tab Take 1 tablet (4 mg) by mouth every 8 hours as needed for nausea  Qty: 12 tablet, Refills: 1    Associated Diagnoses: Ureterolithiasis      tamsulosin (FLOMAX) 0.4 MG capsule Take 1 capsule (0.4 mg) by mouth daily for 45 days  Qty: 45 capsule, Refills: 0    Associated Diagnoses: Ureterolithiasis           CONTINUE these medications which have NOT CHANGED    Details   aspirin 81 MG EC tablet Take 81 mg by mouth daily      metFORMIN (GLUCOPHAGE XR) 500 MG 24 hr tablet Take 1,000 mg by mouth every morning      order for DME Equipment being ordered: patellar stabilization knee brace with horseshoe, XL  Qty: 1 Device, Refills: 0    Associated Diagnoses: Acute pain of right knee           STOP taking these medications       Vitamin D, Cholecalciferol, 1000 units TABS Comments:   Reason for Stopping:             Allergies   Allergies   Allergen Reactions    No Known Drug Allergy
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
## 2023-10-30 ENCOUNTER — TELEPHONE (OUTPATIENT)
Dept: UROLOGY | Facility: CLINIC | Age: 48
End: 2023-10-30
Payer: COMMERCIAL

## 2023-10-30 NOTE — TELEPHONE ENCOUNTER
Message left for patient to call back to schedule a virtual visit in the next 1-2 weeks with any available stone provider.  Ebony Rahman RN

## 2023-10-31 ENCOUNTER — TELEPHONE (OUTPATIENT)
Dept: UROLOGY | Facility: CLINIC | Age: 48
End: 2023-10-31
Payer: COMMERCIAL

## 2023-10-31 NOTE — TELEPHONE ENCOUNTER
Message left for patient to call back to schedule a virtual visit with any stone provider in next 1-2 weeks.  Ebony Rahman RN

## 2023-11-01 ENCOUNTER — PATIENT OUTREACH (OUTPATIENT)
Dept: UROLOGY | Facility: CLINIC | Age: 48
End: 2023-11-01
Payer: COMMERCIAL

## 2023-11-01 NOTE — PROGRESS NOTES
[facility] called the patient and lm with urology number to call back and schedule consult in the next 1-2 weeks.  This is 3rd call to the patient.  Provider Luz Elena Waldron updated.    [facility]

## 2023-11-02 ENCOUNTER — VIRTUAL VISIT (OUTPATIENT)
Dept: UROLOGY | Facility: CLINIC | Age: 48
End: 2023-11-02
Payer: COMMERCIAL

## 2023-11-02 DIAGNOSIS — N20.1 CALCULUS OF DISTAL LEFT URETER: Primary | ICD-10-CM

## 2023-11-02 PROCEDURE — 99203 OFFICE O/P NEW LOW 30 MIN: CPT | Mod: VID | Performed by: NURSE PRACTITIONER

## 2023-11-02 NOTE — PATIENT INSTRUCTIONS
UROLOGY CLINIC VISIT PATIENT INSTRUCTIONS    -If having severe flank pain, fevers, chills, nausea, or vomiting please notify Urology clinic or be seen in the ER.       If you have any issues, questions or concerns in the meantime, do not hesitate to contact us at [facility] at [phone] or via NOZA.     Leela Mai, CNP  Department of Urology       Medicines to Control Your Kidney Stone Symptoms    Control Pain: First Line Treatment    Dramamine (Please use the drowsy version, nongeneric formulation)  Available over the counter  **This medicine will cause increased drowsiness. DON T DRIVE OR OPERATE MACHINERY FOR 6 HOURS**    How to take:   Take 50 mg at bedtime every night until the stone passes  In addition, take 50 mg every 6 hours as needed    What it does:  Decreases spasm of the ureter  Decreases recurrence of pain for next 24 hours  Decreases severe pain  Decreases nausea  Will help you sleep    Ibuprofen (Advil or Motrin)  Available over the counter  **Please do not take if advise to avoid NSAIDS, history of stomach ulcers/bleeding issues, blood thinners, or already on NSAIDS**    How to take:   Take 2 to 4 (200 mg) tablets every 6 hours for the first 48 hours. After that, use only as needed    What it does:  Decreases pain  Prevents spasm of the ureter    Acetaminophen (Tylenol)   Available over the counter    How to Take:  Take 2 (500 mg) tablets every 6 hours as needed. Do not exceed 8 tablets (4,000 mg total) in 24 hours    What it does:  Highly effective in controlling pain      Control pain: second line treatment (if you still have severe pain 1 hour after trying all of the above)    Narcotics (oxycodone)     How to take   Take 1 to 2 tablets every 6 hours as needed    Narcotics have major side effects:   Confusion, disorientation and sleepiness. DO NOT DRIVE OR OPERATE MACHINERY WITHIN 24 HOURS.   Nausea. Take Dramamine, Zofran or Haldol to help control this.   May  cause constipation (hard, dry stools). Start over-the-counter Miralax (1/2 to 1 capful) as needed if experiencing constipation.   Trouble sleeping    Nausea:    Ondansetron (Zofran)    Take 4 mg every 6 hours as needed   Use only for severe nausea      Other medicines we may give you:    Tamsulosin (Flomax): Take 0.4 mg daily with food     What it does:   May decrease stone pain   May help stones pass faster   May make surgery more successful by improving access to stone   May decrease discomfort from ureteral stent, if used    Possible side effects:   Lightheadedness when standing too quickly (especially in older people)   Stuffy nose

## 2023-11-02 NOTE — NURSING NOTE
Is the patient currently in the state of [place]? YES    Visit mode:VIDEO    If the visit is dropped, the patient can be reconnected by: VIDEO VISIT: Text to cell phone:   Telephone Information:   Mobile [phone]       Will anyone else be joining the visit? NO  (If patient encounters technical issues they should call [phone]    How would you like to obtain your AVS? MyChart    Are changes needed to the allergy or medication list? No    Reason for visit: Consult    Arlene ANTONF
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
## 2023-11-03 ENCOUNTER — TRANSFERRED RECORDS (OUTPATIENT)
Dept: HEALTH INFORMATION MANAGEMENT | Facility: CLINIC | Age: 48
End: 2023-11-03
Payer: COMMERCIAL

## 2023-11-03 ENCOUNTER — TELEPHONE (OUTPATIENT)
Dept: UROLOGY | Facility: CLINIC | Age: 48
End: 2023-11-03
Payer: COMMERCIAL

## 2023-11-07 ENCOUNTER — TELEPHONE (OUTPATIENT)
Dept: UROLOGY | Facility: CLINIC | Age: 48
End: 2023-11-07
Payer: COMMERCIAL

## 2023-11-14 ENCOUNTER — LAB (OUTPATIENT)
Dept: LAB | Facility: HOSPITAL | Age: 48
End: 2023-11-14
Payer: COMMERCIAL

## 2023-11-14 DIAGNOSIS — N20.1 CALCULUS OF DISTAL LEFT URETER: Primary | ICD-10-CM

## 2023-11-14 DIAGNOSIS — N20.1 CALCULUS OF DISTAL LEFT URETER: ICD-10-CM

## 2023-11-14 PROCEDURE — 82365 CALCULUS SPECTROSCOPY: CPT

## 2023-11-16 ENCOUNTER — OFFICE VISIT (OUTPATIENT)
Dept: FAMILY MEDICINE | Facility: CLINIC | Age: 48
End: 2023-11-16
Payer: COMMERCIAL

## 2023-11-16 VITALS
TEMPERATURE: 98 F | WEIGHT: 235.6 LBS | BODY MASS INDEX: 36.98 KG/M2 | HEIGHT: 67 IN | OXYGEN SATURATION: 95 % | DIASTOLIC BLOOD PRESSURE: 85 MMHG | SYSTOLIC BLOOD PRESSURE: 129 MMHG | HEART RATE: 75 BPM | RESPIRATION RATE: 20 BRPM

## 2023-11-16 DIAGNOSIS — Z87.442 HISTORY OF KIDNEY STONES: ICD-10-CM

## 2023-11-16 DIAGNOSIS — Z11.59 NEED FOR HEPATITIS C SCREENING TEST: ICD-10-CM

## 2023-11-16 DIAGNOSIS — N53.9 MALE SEXUAL DYSFUNCTION: ICD-10-CM

## 2023-11-16 DIAGNOSIS — Z00.00 ROUTINE GENERAL MEDICAL EXAMINATION AT A HEALTH CARE FACILITY: Primary | ICD-10-CM

## 2023-11-16 DIAGNOSIS — Z11.4 SCREENING FOR HIV (HUMAN IMMUNODEFICIENCY VIRUS): ICD-10-CM

## 2023-11-16 PROCEDURE — 90686 IIV4 VACC NO PRSV 0.5 ML IM: CPT

## 2023-11-16 PROCEDURE — 90471 IMMUNIZATION ADMIN: CPT

## 2023-11-16 PROCEDURE — 87389 HIV-1 AG W/HIV-1&-2 AB AG IA: CPT

## 2023-11-16 PROCEDURE — 80048 BASIC METABOLIC PNL TOTAL CA: CPT

## 2023-11-16 PROCEDURE — 99213 OFFICE O/P EST LOW 20 MIN: CPT | Mod: 25

## 2023-11-16 PROCEDURE — 91320 SARSCV2 VAC 30MCG TRS-SUC IM: CPT

## 2023-11-16 PROCEDURE — 83690 ASSAY OF LIPASE: CPT

## 2023-11-16 PROCEDURE — 36415 COLL VENOUS BLD VENIPUNCTURE: CPT

## 2023-11-16 PROCEDURE — 86803 HEPATITIS C AB TEST: CPT

## 2023-11-16 PROCEDURE — 99396 PREV VISIT EST AGE 40-64: CPT | Mod: 25

## 2023-11-16 PROCEDURE — 90480 ADMN SARSCOV2 VAC 1/ONLY CMP: CPT

## 2023-11-16 RX ORDER — METFORMIN HCL 500 MG
1000 TABLET, EXTENDED RELEASE 24 HR ORAL 2 TIMES DAILY WITH MEALS
COMMUNITY
Start: 2023-11-16

## 2023-11-16 RX ORDER — SILDENAFIL 25 MG/1
25 TABLET, FILM COATED ORAL DAILY PRN
Qty: 30 TABLET | Refills: 4 | Status: SHIPPED | OUTPATIENT
Start: 2023-11-16

## 2023-11-16 ASSESSMENT — ENCOUNTER SYMPTOMS
HEMATURIA: 0
FEVER: 0
HEARTBURN: 0
PARESTHESIAS: 0
JOINT SWELLING: 0
DIZZINESS: 0
PALPITATIONS: 0
DYSURIA: 0
WEAKNESS: 0
HEMATOCHEZIA: 0
MYALGIAS: 0
CHILLS: 0
CONSTIPATION: 0
ARTHRALGIAS: 0
SHORTNESS OF BREATH: 0
NERVOUS/ANXIOUS: 0
ABDOMINAL PAIN: 0
HEADACHES: 0
SORE THROAT: 0
DIARRHEA: 0
FREQUENCY: 0
NAUSEA: 0
COUGH: 0
EYE PAIN: 0

## 2023-11-16 ASSESSMENT — PATIENT HEALTH QUESTIONNAIRE - PHQ9
SUM OF ALL RESPONSES TO PHQ QUESTIONS 1-9: 2
10. IF YOU CHECKED OFF ANY PROBLEMS, HOW DIFFICULT HAVE THESE PROBLEMS MADE IT FOR YOU TO DO YOUR WORK, TAKE CARE OF THINGS AT HOME, OR GET ALONG WITH OTHER PEOPLE: NOT DIFFICULT AT ALL
SUM OF ALL RESPONSES TO PHQ QUESTIONS 1-9: 2

## 2023-11-16 NOTE — PROGRESS NOTES
SUBJECTIVE:   Clint is a 48 year old, presenting for the following:  Physical        11/16/2023     1:55 PM   Additional Questions   Roomed by Elvia       Healthy Habits:     Getting at least 3 servings of Calcium per day:  Yes    Bi-annual eye exam:  Yes    Dental care twice a year:  Yes    Sleep apnea or symptoms of sleep apnea:  None    Diet:  Regular (no restrictions)    Frequency of exercise:  2-3 days/week    Duration of exercise:  45-60 minutes    Taking medications regularly:  Yes    Medication side effects:  None    Additional concerns today:  Yes    Today's PHQ-9 Score:       11/16/2023     1:30 PM   PHQ-9 SCORE   PHQ-9 Total Score MyChart 2 (Minimal depression)   PHQ-9 Total Score 2     ED    Duration: ongoing for year  Description (location/character/radiation): Has had testosterone issues in past   Intensity:  moderate  Accompanying signs and symptoms: none  History (similar episodes/previous evaluation): yes  Precipitating or alleviating factors: None  Therapies tried and outcome: none was given rx in the past but could not afford     Longstanding history of erectile dysfunction, has been getting worse with age. Patient reports he can get an erection but cannot maintain during intercourse. Does have morning erections.   Has been seen for this issue at [facility] ([facility]) recently, and reports he had a low testosterone level. Testosterone level was previously checked and low during an ED workup in 2012. He was previously prescribed sildenafil but could not afford.   ED has been causing stress on his relationship of about one year, with he and his partner both frustrated with the issue. They are working through emotional aspect together. Patient reports some guilt and shame regarding his inability to maintain an erection. Denies therapy referral at this time.     Reports recent labs at [facility], including lipid panel and TSH. He will have results sent. Recent  treatment for kidney stone in left ureter at [facility] 10/31. Creatinine and GFR elevated at that time, will recheck today. Patient passed the stone without surgical intervention. Prediabetes, on metformin BID. Mildly elevated lipase 10/27, will reevaluate today.     Have you ever done Advance Care Planning? (For example, a Health Directive, POLST, or a discussion with a medical provider or your loved ones about your wishes): No, advance care planning information given to patient to review.  Patient declined advance care planning discussion at this time.    Social History     Tobacco Use    Smoking status: Former     Packs/day: 0.50     Years: 20.00     Additional pack years: 0.00     Total pack years: 10.00     Types: Cigarettes     Quit date: 2018     Years since quittin.1    Smokeless tobacco: Never   Substance Use Topics    Alcohol use: No           2023     1:32 PM   Alcohol Use   Prescreen: >3 drinks/day or >7 drinks/week? Not Applicable     Reviewed orders with patient. Reviewed health maintenance and updated orders accordingly - Yes  Routine health maintenance reviewed with patient.  - Colon cancer screening last complete 22. Two sessile polyps removed from sigmoid and proximal descending colon, 3-4 mm in size. No adenomas. Every 5 year screening recommended. Due 2027.   - Prostate cancer screening last complete: never. No prostate symptoms or family history of prostate cancer. As patient just passed a kidney stone, will not screen this year as PSA may be falsely elevated.   - Vaccines reviewed and discussed at this encounter: Hep B, COVID, flu, and shingles. He will decline Hep B today, receive flu and COVID, and get shingles vaccine at the pharmacy.    Sexually active with one female partner, does not desire STI testing today. He recently had screening at [facility].  Sexual concerns or dysfunction: ED, discussed above.    Reviewed and updated as needed this visit by clinical staff    " Allergies  Meds            Reviewed past medical and surgical history and family history and updated patient chart.     Review of Systems   Constitutional:  Negative for chills and fever.   HENT:  Negative for congestion, ear pain, hearing loss and sore throat.    Eyes:  Positive for visual disturbance. Negative for pain.   Respiratory:  Negative for cough and shortness of breath.    Cardiovascular:  Negative for chest pain, palpitations and peripheral edema.   Gastrointestinal:  Negative for abdominal pain, constipation, diarrhea, heartburn, hematochezia and nausea.   Genitourinary:  Positive for impotence. Negative for dysuria, frequency, genital sores, hematuria, penile discharge and urgency.   Musculoskeletal:  Negative for arthralgias, joint swelling and myalgias.   Skin:  Negative for rash.   Neurological:  Negative for dizziness, weakness, headaches and paresthesias.   Psychiatric/Behavioral:  Negative for mood changes. The patient is not nervous/anxious.      OBJECTIVE:   /85 (BP Location: Left arm, Patient Position: Sitting, Cuff Size: Adult Large)   Pulse 75   Temp 98  F (36.7  C) (Oral)   Resp 20   Ht 1.689 m (5' 6.5\")   Wt 106.9 kg (235 lb 9.6 oz)   SpO2 95%   BMI 37.46 kg/m      Physical Exam  GENERAL: healthy, alert and no distress  EYES: Eyes grossly normal to inspection, PERRL and conjunctivae and sclerae normal  HENT: ear canals and TM's normal, nose and mouth without ulcers or lesions  NECK: no adenopathy, no asymmetry, masses, or scars and thyroid normal to palpation  RESP: lungs clear to auscultation - no rales, rhonchi or wheezes  CV: regular rate and rhythm, normal S1 S2, no S3 or S4, no murmur, click or rub, no peripheral edema and peripheral pulses strong  ABDOMEN: soft, nontender, no hepatosplenomegaly, no masses and bowel sounds normal  MS: no gross musculoskeletal defects noted, no edema  SKIN: no suspicious lesions or rashes  NEURO: Normal strength and tone, mentation " intact and speech normal  PSYCH: mentation appears normal, affect normal/bright    ASSESSMENT/PLAN:   Clint was seen today for physical.    1. Routine general medical examination at a health care facility  - Health maintenance and lifestyle reviewed. Updated medical and family history.  - Follow up in one year or sooner as needed.   - Patient will get records from [facility] sent over  - REVIEW OF HEALTH MAINTENANCE PROTOCOL ORDERS  - COVID-19 12+ (2023-24) (PFIZER)  - PRIMARY CARE FOLLOW-UP SCHEDULING; Future    2. Male sexual dysfunction  - Discussed at length with patient. He desires endocrinology follow up for low testosterone. Discussed additional options if treatment is ineffective, including sexual health consult.   - Adult Endocrinology  Referral; Future  - sildenafil (VIAGRA) 25 MG tablet; Take 1 tablet (25 mg) by mouth daily as needed (Do not take more 100 mg  in one day)  Dispense: 30 tablet; Refill: 4  - No ED medications are on formulary for his insurance. Discussed with patient.     3. Screening for HIV (human immunodeficiency virus)  - Discussed once-lifetime screening based on USPSTF guidelines. Discussed risk factors for virus transmission. Patient accepting of screening.  - HIV Antigen Antibody Combo    4. Need for hepatitis C screening test  - Discussed once-lifetime screening based on USPSTF guidelines. Discussed risk factors for virus transmission. Patient accepting of screening.  - Hepatitis C Screen Reflex to HCV RNA Quant and Genotype    5. History of kidney stones  - Elevated lipase and kidney function labs at previous ER visit. Kidney stone passed without surgical intervention. Will recheck labs today.   - Basic metabolic panel  (Ca, Cl, CO2, Creat, Gluc, K, Na, BUN); Future  - Lipase    Patient has been advised of split billing requirements and indicates understanding: Yes    COUNSELING:   Reviewed preventive health counseling, as reflected in patient instructions  Special  "attention given to:        Regular exercise       Healthy diet/nutrition       Immunizations  Vaccinated for: Covid-19 and Influenza         Safe sex practices/STD prevention       Consider Hep C screening for all patients one time for ages 18-79 years       HIV screeninx in teen years, 1x in adult years, and at intervals if high risk       Colorectal cancer screening    BMI:   Estimated body mass index is 37.46 kg/m  as calculated from the following:    Height as of this encounter: 1.689 m (5' 6.5\").    Weight as of this encounter: 106.9 kg (235 lb 9.6 oz).   Weight management plan: Discussed healthy diet and exercise guidelines    He reports that he quit smoking about 5 years ago. His smoking use included cigarettes. He has a 10.00 pack-year smoking history. He has never used smokeless tobacco.          [facility]  Answers submitted by the patient for this visit:  Patient Health Questionnaire (Submitted on 2023)  If you checked off any problems, how difficult have these problems made it for you to do your work, take care of things at home, or get along with other people?: Not difficult at all  PHQ9 TOTAL SCORE: 2    "

## 2023-11-17 LAB
ANION GAP SERPL CALCULATED.3IONS-SCNC: 17 MMOL/L (ref 7–15)
APPEARANCE STONE: NORMAL
BUN SERPL-MCNC: 17.6 MG/DL (ref 6–20)
CALCIUM SERPL-MCNC: 10 MG/DL (ref 8.6–10)
CHLORIDE SERPL-SCNC: 102 MMOL/L (ref 98–107)
COMPN STONE: NORMAL
CREAT SERPL-MCNC: 1.42 MG/DL (ref 0.67–1.17)
DEPRECATED HCO3 PLAS-SCNC: 23 MMOL/L (ref 22–29)
EGFRCR SERPLBLD CKD-EPI 2021: 60 ML/MIN/1.73M2
GLUCOSE SERPL-MCNC: 84 MG/DL (ref 70–99)
HCV AB SERPL QL IA: NONREACTIVE
HIV 1+2 AB+HIV1 P24 AG SERPL QL IA: NONREACTIVE
LIPASE SERPL-CCNC: 114 U/L (ref 13–60)
POTASSIUM SERPL-SCNC: 4.7 MMOL/L (ref 3.4–5.3)
SODIUM SERPL-SCNC: 142 MMOL/L (ref 135–145)
SPECIMEN WT: 41 MG

## 2023-11-20 ENCOUNTER — VIRTUAL VISIT (OUTPATIENT)
Dept: UROLOGY | Facility: CLINIC | Age: 48
End: 2023-11-20
Payer: COMMERCIAL

## 2023-11-20 ENCOUNTER — TELEPHONE (OUTPATIENT)
Dept: UROLOGY | Facility: CLINIC | Age: 48
End: 2023-11-20

## 2023-11-20 VITALS — WEIGHT: 231.5 LBS | HEIGHT: 67 IN | BODY MASS INDEX: 36.34 KG/M2

## 2023-11-20 DIAGNOSIS — N20.0 NEPHROLITHIASIS: ICD-10-CM

## 2023-11-20 DIAGNOSIS — N20.0 CALCIUM OXALATE RENAL STONES: Primary | ICD-10-CM

## 2023-11-20 PROCEDURE — 99214 OFFICE O/P EST MOD 30 MIN: CPT | Mod: VID | Performed by: NURSE PRACTITIONER

## 2023-11-20 ASSESSMENT — PAIN SCALES - GENERAL: PAINLEVEL: MILD PAIN (2)
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
## 2023-11-20 NOTE — NURSING NOTE
Is the patient currently in the state of [place]? YES    Visit mode:VIDEO    If the visit is dropped, the patient can be reconnected by: VIDEO VISIT: Text to cell phone:   Telephone Information:   Mobile [phone]       Will anyone else be joining the visit? NO  (If patient encounters technical issues they should call [phone])    How would you like to obtain your AVS? MyChart    Are changes needed to the allergy or medication list? No    Reason for visit: DONNIE VEGAS
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
## 2023-11-20 NOTE — PATIENT INSTRUCTIONS
"DIET & LIFESTYLE CHANGES FOR PATIENTS WITH KIDNEY STONES    If you've had a kidney stone, you are likely to form another. Risk of recurrence is 15% at 1 year, 35% to 40% at 2 years, and 50% at 10 years. Therefore, prevention is very important.  Because of the chemical analysis of both your stone & urine, some changes in your diet & lifestyle are recommended. These recommendations have shown to be effective.    CALCIUM STONES (Oxalate and Phosphate)    Fluid intake is the most important prevention measure to help prevent stones. Fluid intake should be at least 2.5 liters per day. With goal of urine output of >2.5L per day.   Increasing liquids that have citric acid may help such as low calorie orange juice, lemonade (Crystal Light Lemonade), or adding a citrus to your water.  You can begin adding lemon juice or fresh susie to your water daily.  Lemon juice increases the citrate in your urine, and helps decrease the formation of stone and even breakdown certain types of stones. Add a cap full/teaspoon of pure lemon juice to each glass.   Try to limit sugar, especially if you have diabetes.    Helpful Fluid Measurements:  1 liter = 34oz  1 quart = 32 oz  24 pack water: Each bottle 16.9 oz     Limit your consumption of OXALATE-rich foods including:  All nuts and nut products including peanuts, almonds,peanut butter, almond milk  Spinach  Rhubarb  Beets  Chocolate  Soybeans and soy products   Wheat Germ    [email or web address]  Below is a link to a PDF that is based on [employer] research. Please stick to pages 6-9 of this document. My suggestion is to review the list of food that is OK. The \"avoid\" list can be [email or web address]  [email or web address]_eid=[email or web address]      Trying a DASH (Dietary Approaches to Stop Hypertension) diet which is eating more fruits and vegetables, limiting salt intake, " moderate in low-fat diary products, and low in animal protein.   Try to decrease salt intake to <2000 mg of sodium daily.    Reduce animal protein (meat) intake to no more than 10 ounces or less than 50 grams per day.     Calcium rich foods are encouraged, but no more than 1000 - 1200 mg per day.   Calcium rich foods include:   Diary (cheese, milk, and yogurt)  Enriched cereals    Limit Vitamin C intake to < 1000 mg daily.      Consultation with a dietician may be helpful as well.  Please let our staff know if you are interested in this helpful option so a consult may be placed for you.

## 2024-01-22 ENCOUNTER — VIRTUAL VISIT (OUTPATIENT)
Dept: UROLOGY | Facility: CLINIC | Age: 49
End: 2024-01-22
Payer: COMMERCIAL

## 2024-01-22 DIAGNOSIS — Z30.09 VASECTOMY EVALUATION: Primary | ICD-10-CM

## 2024-01-22 PROCEDURE — 99213 OFFICE O/P EST LOW 20 MIN: CPT | Mod: 95 | Performed by: UROLOGY

## 2024-01-22 NOTE — PROGRESS NOTES
"Clint is a 48 year old who is being evaluated via a billable video visit.      How would you like to obtain your AVS? MyChart  If the video visit is dropped, the invitation should be resent by: Text to cell phone: [phone]  Will anyone else be joining your video visit? No              Subjective   Clint is a 48 year old, presenting for the following health issues:  Consult    HPI     Patient is interested in vasectomy.  He has 3 kids.      Review of Systems  Constitutional, HEENT, cardiovascular, pulmonary, gi and gu systems are negative, except as otherwise noted.      Objective    Vitals - Patient Reported  Weight (Patient Reported): 102.1 kg (225 lb)  Height (Patient Reported): 170.2 cm (5' 7\")  BMI (Based on Pt Reported Ht/Wt): 35.24  Pain Score: No Pain (0)        Physical Exam   GENERAL: alert and no distress  EYES: Eyes grossly normal to inspection.  No discharge or erythema, or obvious scleral/conjunctival abnormalities.  RESP: No audible wheeze, cough, or visible cyanosis.    SKIN: Visible skin clear. No significant rash, abnormal pigmentation or lesions.  NEURO: Cranial nerves grossly intact.  Mentation and speech appropriate for age.  PSYCH: Appropriate affect, tone, and pace of words    Discussed    That vasectomy is permanent method of birth control.    That vasectomy can fail due to recanalization of the vas even many months/years later.    That he needs 2 negative sperm checks to be considered sterile    That there are other methods that are not permanent and also that the sperm can be frozen for later use.    How the technique is performed, risks of infection, bleeding, damage to the testes vessels and testes atrophy    Long term complication such as chronic and difficult to treat testes pain and questionable increase incidence of prostate cancer    That the procedure can be done at the clinic or hospital OR        Plan:    Stop Aspirin  Will schedule Vasectomy in the future      Video-Visit " Details    Type of service:  Video Visit   Video Start Time:   Video End Time:    Originating Location (pt. Location): Home    Distant Location (provider location):  On-site  Platform used for Video Visit: [employer]ir  Signed Electronically by: Shlomo Kong MD

## 2024-03-27 NOTE — PROGRESS NOTES
Order(s) created erroneously. Erroneous order ID: [id number]   Order canceled by: BAHMAN ROMAN   Order cancel date/time: 03/27/2024 3:01 PM

## 2024-07-16 ENCOUNTER — APPOINTMENT (OUTPATIENT)
Dept: CT IMAGING | Facility: HOSPITAL | Age: 49
End: 2024-07-16
Payer: COMMERCIAL

## 2024-07-16 ENCOUNTER — HOSPITAL ENCOUNTER (EMERGENCY)
Facility: HOSPITAL | Age: 49
Discharge: HOME | End: 2024-07-16
Attending: STUDENT IN AN ORGANIZED HEALTH CARE EDUCATION/TRAINING PROGRAM | Admitting: STUDENT IN AN ORGANIZED HEALTH CARE EDUCATION/TRAINING PROGRAM
Payer: COMMERCIAL

## 2024-07-16 VITALS
HEIGHT: 66 IN | WEIGHT: 236.33 LBS | RESPIRATION RATE: 22 BRPM | DIASTOLIC BLOOD PRESSURE: 67 MMHG | TEMPERATURE: 97.2 F | OXYGEN SATURATION: 94 % | SYSTOLIC BLOOD PRESSURE: 133 MMHG | HEART RATE: 57 BPM | BODY MASS INDEX: 37.98 KG/M2

## 2024-07-16 DIAGNOSIS — N39.0 COMPLICATED UTI (URINARY TRACT INFECTION): ICD-10-CM

## 2024-07-16 DIAGNOSIS — N20.1 URETEROLITHIASIS: ICD-10-CM

## 2024-07-16 LAB
ALBUMIN UR-MCNC: 50 MG/DL
ANION GAP SERPL CALCULATED.3IONS-SCNC: 12 MMOL/L (ref 7–15)
APPEARANCE UR: ABNORMAL
BACTERIA #/AREA URNS HPF: ABNORMAL /HPF
BASOPHILS # BLD AUTO: 0.1 10E3/UL (ref 0–0.2)
BASOPHILS NFR BLD AUTO: 1 %
BILIRUB UR QL STRIP: NEGATIVE
BUN SERPL-MCNC: 14 MG/DL (ref 6–20)
CALCIUM SERPL-MCNC: 9.2 MG/DL (ref 8.8–10.4)
CHLORIDE SERPL-SCNC: 103 MMOL/L (ref 98–107)
COLOR UR AUTO: YELLOW
CREAT SERPL-MCNC: 1.07 MG/DL (ref 0.67–1.17)
EGFRCR SERPLBLD CKD-EPI 2021: 84 ML/MIN/1.73M2
EOSINOPHIL # BLD AUTO: 0.1 10E3/UL (ref 0–0.7)
EOSINOPHIL NFR BLD AUTO: 1 %
ERYTHROCYTE [DISTWIDTH] IN BLOOD BY AUTOMATED COUNT: 12 % (ref 10–15)
GLUCOSE SERPL-MCNC: 113 MG/DL (ref 70–99)
GLUCOSE UR STRIP-MCNC: NEGATIVE MG/DL
HCO3 SERPL-SCNC: 26 MMOL/L (ref 22–29)
HCT VFR BLD AUTO: 46.4 % (ref 40–53)
HGB BLD-MCNC: 16.3 G/DL (ref 13.3–17.7)
HGB UR QL STRIP: ABNORMAL
HOLD SPECIMEN: NORMAL
IMM GRANULOCYTES # BLD: 0 10E3/UL
IMM GRANULOCYTES NFR BLD: 0 %
KETONES UR STRIP-MCNC: NEGATIVE MG/DL
LEUKOCYTE ESTERASE UR QL STRIP: ABNORMAL
LYMPHOCYTES # BLD AUTO: 2.4 10E3/UL (ref 0.8–5.3)
LYMPHOCYTES NFR BLD AUTO: 31 %
MCH RBC QN AUTO: 30.1 PG (ref 26.5–33)
MCHC RBC AUTO-ENTMCNC: 35.1 G/DL (ref 31.5–36.5)
MCV RBC AUTO: 86 FL (ref 78–100)
MONOCYTES # BLD AUTO: 0.5 10E3/UL (ref 0–1.3)
MONOCYTES NFR BLD AUTO: 7 %
NEUTROPHILS # BLD AUTO: 4.6 10E3/UL (ref 1.6–8.3)
NEUTROPHILS NFR BLD AUTO: 59 %
NITRATE UR QL: NEGATIVE
NRBC # BLD AUTO: 0 10E3/UL
NRBC BLD AUTO-RTO: 0 /100
PH UR STRIP: 5.5 [PH] (ref 5–7)
PLATELET # BLD AUTO: 263 10E3/UL (ref 150–450)
POTASSIUM SERPL-SCNC: 4.2 MMOL/L (ref 3.4–5.3)
RBC # BLD AUTO: 5.42 10E6/UL (ref 4.4–5.9)
RBC URINE: >182 /HPF
SODIUM SERPL-SCNC: 141 MMOL/L (ref 135–145)
SP GR UR STRIP: 1.02 (ref 1–1.03)
UROBILINOGEN UR STRIP-MCNC: <2 MG/DL
WBC # BLD AUTO: 7.8 10E3/UL (ref 4–11)
WBC URINE: 10 /HPF
YEAST #/AREA URNS HPF: ABNORMAL /HPF

## 2024-07-16 PROCEDURE — 82565 ASSAY OF CREATININE: CPT

## 2024-07-16 PROCEDURE — 36415 COLL VENOUS BLD VENIPUNCTURE: CPT | Performed by: STUDENT IN AN ORGANIZED HEALTH CARE EDUCATION/TRAINING PROGRAM

## 2024-07-16 PROCEDURE — 96365 THER/PROPH/DIAG IV INF INIT: CPT

## 2024-07-16 PROCEDURE — 96375 TX/PRO/DX INJ NEW DRUG ADDON: CPT

## 2024-07-16 PROCEDURE — 250N000011 HC RX IP 250 OP 636: Performed by: STUDENT IN AN ORGANIZED HEALTH CARE EDUCATION/TRAINING PROGRAM

## 2024-07-16 PROCEDURE — 99285 EMERGENCY DEPT VISIT HI MDM: CPT | Mod: 25

## 2024-07-16 PROCEDURE — 250N000011 HC RX IP 250 OP 636

## 2024-07-16 PROCEDURE — 93005 ELECTROCARDIOGRAM TRACING: CPT

## 2024-07-16 PROCEDURE — 82374 ASSAY BLOOD CARBON DIOXIDE: CPT

## 2024-07-16 PROCEDURE — 81001 URINALYSIS AUTO W/SCOPE: CPT | Performed by: STUDENT IN AN ORGANIZED HEALTH CARE EDUCATION/TRAINING PROGRAM

## 2024-07-16 PROCEDURE — 96376 TX/PRO/DX INJ SAME DRUG ADON: CPT

## 2024-07-16 PROCEDURE — 85041 AUTOMATED RBC COUNT: CPT

## 2024-07-16 PROCEDURE — 250N000013 HC RX MED GY IP 250 OP 250 PS 637

## 2024-07-16 PROCEDURE — 74176 CT ABD & PELVIS W/O CONTRAST: CPT

## 2024-07-16 RX ORDER — OXYCODONE HYDROCHLORIDE 5 MG/1
5 TABLET ORAL EVERY 6 HOURS PRN
Qty: 12 TABLET | Refills: 0 | Status: SHIPPED | OUTPATIENT
Start: 2024-07-16 | End: 2024-07-19

## 2024-07-16 RX ORDER — OXYCODONE HYDROCHLORIDE 5 MG/1
5 TABLET ORAL ONCE
Status: COMPLETED | OUTPATIENT
Start: 2024-07-16 | End: 2024-07-16

## 2024-07-16 RX ORDER — MORPHINE SULFATE 4 MG/ML
4 INJECTION, SOLUTION INTRAMUSCULAR; INTRAVENOUS
Status: COMPLETED | OUTPATIENT
Start: 2024-07-16 | End: 2024-07-16

## 2024-07-16 RX ORDER — CEFTRIAXONE 1 G/1
1 INJECTION, POWDER, FOR SOLUTION INTRAMUSCULAR; INTRAVENOUS ONCE
Status: COMPLETED | OUTPATIENT
Start: 2024-07-16 | End: 2024-07-16

## 2024-07-16 RX ORDER — TAMSULOSIN HYDROCHLORIDE 0.4 MG/1
0.4 CAPSULE ORAL DAILY
Qty: 10 CAPSULE | Refills: 0 | Status: SHIPPED | OUTPATIENT
Start: 2024-07-16 | End: 2024-07-26

## 2024-07-16 RX ORDER — KETOROLAC TROMETHAMINE 15 MG/ML
15 INJECTION, SOLUTION INTRAMUSCULAR; INTRAVENOUS ONCE
Status: COMPLETED | OUTPATIENT
Start: 2024-07-16 | End: 2024-07-16

## 2024-07-16 RX ORDER — KETOROLAC TROMETHAMINE 10 MG/1
10 TABLET, FILM COATED ORAL EVERY 6 HOURS PRN
Qty: 20 TABLET | Refills: 0 | Status: SHIPPED | OUTPATIENT
Start: 2024-07-16

## 2024-07-16 RX ORDER — ONDANSETRON 2 MG/ML
4 INJECTION INTRAMUSCULAR; INTRAVENOUS EVERY 30 MIN PRN
Status: DISCONTINUED | OUTPATIENT
Start: 2024-07-16 | End: 2024-07-16 | Stop reason: HOSPADM

## 2024-07-16 RX ADMIN — OXYCODONE HYDROCHLORIDE 5 MG: 5 TABLET ORAL at 13:21

## 2024-07-16 RX ADMIN — KETOROLAC TROMETHAMINE 15 MG: 15 INJECTION, SOLUTION INTRAMUSCULAR; INTRAVENOUS at 15:01

## 2024-07-16 RX ADMIN — ONDANSETRON 4 MG: 2 INJECTION INTRAMUSCULAR; INTRAVENOUS at 14:50

## 2024-07-16 RX ADMIN — MORPHINE SULFATE 4 MG: 4 INJECTION, SOLUTION INTRAMUSCULAR; INTRAVENOUS at 14:47

## 2024-07-16 RX ADMIN — ONDANSETRON 4 MG: 2 INJECTION INTRAMUSCULAR; INTRAVENOUS at 13:19

## 2024-07-16 RX ADMIN — CEFTRIAXONE SODIUM 1 G: 1 INJECTION, POWDER, FOR SOLUTION INTRAMUSCULAR; INTRAVENOUS at 15:57

## 2024-07-16 ASSESSMENT — ACTIVITIES OF DAILY LIVING (ADL)
ADLS_ACUITY_SCORE: 35

## 2024-07-16 ASSESSMENT — COLUMBIA-SUICIDE SEVERITY RATING SCALE - C-SSRS
6. HAVE YOU EVER DONE ANYTHING, STARTED TO DO ANYTHING, OR PREPARED TO DO ANYTHING TO END YOUR LIFE?: NO
1. IN THE PAST MONTH, HAVE YOU WISHED YOU WERE DEAD OR WISHED YOU COULD GO TO SLEEP AND NOT WAKE UP?: NO
2. HAVE YOU ACTUALLY HAD ANY THOUGHTS OF KILLING YOURSELF IN THE PAST MONTH?: NO

## 2024-07-16 NOTE — ED PROVIDER NOTES
Emergency Department Encounter   NAME: Clint Alvarez ; AGE: 49 year old male ; YOB: 1975 ; MRN: [id number] ; PCP: Israel Matthews   ED PROVIDER: Mckenna Atwood PA-C    Evaluation Date & Time:   7/16/2024 12:19 PM    CHIEF COMPLAINT:  Flank Pain      IMPRESSION AND PLAN   MDM: Clint Alvarez is a 49 year old male with a pertinent history of MDD, DEENA, obesity who presents to the ED by car for evaluation of right-sided flank pain that began abruptly around 9 AM this morning.    Vitals - hypertensive, afebrile, bradycardic. On exam patient is in mild distress due to pain however is not ill-appearing.  Abdomen is soft with notable tenderness in the right flank and right inguinal area.  Upon bearing down, there is no obvious hernia protruding from the right groin area.  No CVA tenderness noted bilaterally.  Differentials include UTI, pyelonephritis, nephrolithiasis, hernia, hepatobiliary etiology.     Patient initially given Zofran and oxycodone for pain management.  CT imaging revealed a 0.3 cm obstructing stone in the R distal ureter which is likely the cause of the patient's symptoms. EKG reveals sinus bradycardia, patient continues to remain bradycardic despite high pain level. CBC without leukocytosis. BMP is without evidence of acute kidney injury or emergent electrolyte abnormality.  UA reveals evidence of blood, positive for leukocytes and bacteria indicating a likely infected ureteral stone.  Received a message from nursing staff noting that patient is experiencing worsening pain despite narcotic medication.  Will move forward with Toradol and morphine at this time.     Upon reevaluation, patient is resting comfortably in bed, he reports symptomatic improvement with additional pain medication. Informed patient that I spoke with [facility] who recommended outpatient clinic follow-up. Additionally, the agreed with first dose of rocephin in ED, however will wait for culture to result to determine  further antibiotic treatment. In the meantime, will plan to discharge patient with pain medication, flomax and strict return precautions. Patient is amenable to this plan and feels comfortable with discharge.    Patient discharged in improved condition but instructed to return to the emergency department with any new or worsening of symptoms. Patient was educated on UTI in males and provided informational resources. Patient expressed understanding, feels comfortable, and is in agreement with this plan. All questions addressed prior to discharge.    Patient seen in conjunction with Dr. Richards.    History:  Supplemental history from: Family Member/Significant Other  External Record(s) reviewed: Documented in chart    Work Up:  Chart documentation includes differential considered and any EKGs or imaging independently interpreted by provider, where specified.  In additional to work up documented, I considered the following work up: Documented in chart, if applicable.    External consultation:  Discussion of management with another provider: Documented in chart, if applicable    Complicating factors:  Care impacted by chronic illness: See HPI.  Care affected by social determinants of health: N/A    Disposition considerations: Discharge. I prescribed additional prescription strength medication(s) as charted. See documentation for any additional details.    Chart Review: Reviewed case in November 2023.  Patient presented for follow-up on a left distal ureteral stone.  Patient was able to pass a stone shortly after his initial urology appointment.  Patient was able to retrieve the stone and analysis showed 90% calcium oxalate monohydrate.  This was patient's third stone episode so far.  Patient and provider discussed general measures to prevent recurrent kidney stones.  Additionally, ionized calcium and PTH were drawn for future evaluation.    ED COURSE:  1:30 PM I met and introduced myself to the patient. I gathered initial  history and performed my physical exam. We discussed plan for initial workup.   2:43 PM I have staffed the patient with Dr. Maurice ED MD, who has evaluated the patient and agrees with all aspects of today's care.  3:51 PM I spoke with BEBA Diaz who recommends outpatient management with pain medication and clinic follow-up.   3:53 PM I rechecked the patient and discussed results, discharge, follow up, and reasons to return to the ED.         At the conclusion of the encounter I discussed the results of all the tests and the disposition. The questions were answered. The patient or family acknowledged understanding and was agreeable with the care plan.    FINAL IMPRESSION:    ICD-10-CM    1. Ureterolithiasis  N20.1       2. Complicated UTI (urinary tract infection)  N39.0             MEDICATIONS GIVEN IN THE EMERGENCY DEPARTMENT:  Medications   ondansetron (ZOFRAN) injection 4 mg (4 mg Intravenous $Given 7/16/24 1450)   oxyCODONE (ROXICODONE) tablet 5 mg (5 mg Oral $Given 7/16/24 1321)   morphine (PF) injection 4 mg (4 mg Intravenous $Given 7/16/24 1447)   ketorolac (TORADOL) injection 15 mg (15 mg Intravenous $Given 7/16/24 1501)         NEW PRESCRIPTIONS STARTED AT TODAY'S ED VISIT:  New Prescriptions    No medications on file         BRIEF HPI   Patient information was obtained from: Patient and Family Member   Use of Intrepreter: N/A     Clint Alvarez is a 49 year old male who presents to the emergency department complaining of sudden onset right-sided flank pain that began abruptly around 9 AM this morning.  Patient denies any trauma or inciting event prior to the onset of the symptoms.  Patient reports pain in his right flank that radiates down into his right groin area.  Patient also complains of dysuria, darker colored urine and nausea due to the pain.  Patient otherwise denies fever, chills, vomiting, changes in bowel movements, testicular swelling or penile discharge.      REVIEW OF  "SYSTEMS:  Pertinent positive and negative symptoms per HPI.       MEDICAL HISTORY     Past Medical History:   Diagnosis Date    DDD (degenerative disc disease)     Gallstone 2012    Kidney stone     Lyme disease        Past Surgical History:   Procedure Laterality Date    CHOLECYSTECTOMY      COLONOSCOPY N/A 2022    Procedure: COLONOSCOPY, WITH POLYPECTOMY;  Surgeon: Carol Narayanan MD;  Location: [facility] OR    INSERT CHEST TUBE             Family History   Problem Relation Age of Onset    Chronic Obstructive Pulmonary Disease Mother     Liver Cancer Father     Diabetes Type 2  Father        Social History     Tobacco Use    Smoking status: Former     Current packs/day: 0.00     Average packs/day: 0.5 packs/day for 20.0 years (10.0 ttl pk-yrs)     Types: Cigarettes     Start date: 1998     Quit date: 2018     Years since quittin.8    Smokeless tobacco: Never   Substance Use Topics    Alcohol use: No    Drug use: No       aspirin 81 MG EC tablet  dimenhyDRINATE (DRAMAMINE) 50 MG tablet  ketorolac (TORADOL) 10 MG tablet  metFORMIN (GLUCOPHAGE XR) 500 MG 24 hr tablet  ondansetron (ZOFRAN ODT) 4 MG ODT tab  order for DME  sildenafil (VIAGRA) 25 MG tablet        PHYSICAL EXAM     First Vitals:  Patient Vitals for the past 24 hrs:   BP Temp Temp src Pulse Resp SpO2 Height Weight   24 1500 (!) 183/88 -- -- 52 -- 98 % -- --   24 1440 (!) 184/91 -- -- 51 -- 99 % -- --   24 1400 (!) 148/91 -- -- (!) 46 -- 96 % -- --   24 1330 (!) 175/102 -- -- 51 16 96 % -- --   24 1300 (!) 143/90 -- -- 54 -- 97 % -- --   24 1216 (!) 152/86 97.2  F (36.2  C) Temporal (!) 48 16 97 % 1.676 m (5' 6\") 107.2 kg (236 lb 5.3 oz)       PHYSICAL EXAM:  Physical Exam  Vitals and nursing note reviewed.   Constitutional:       General: He is in acute distress.      Appearance: He is obese. He is not ill-appearing.   HENT:      Head: Normocephalic and atraumatic.   Cardiovascular:      Rate " and Rhythm: Regular rhythm. Bradycardia present.   Pulmonary:      Effort: Pulmonary effort is normal.   Abdominal:      General: Abdomen is flat.      Palpations: Abdomen is soft.      Tenderness: There is abdominal tenderness in the suprapubic area. There is no right CVA tenderness, left CVA tenderness or guarding.      Hernia: No hernia is present.      Comments: R flank tenderness to palpation.   Musculoskeletal:         General: Normal range of motion.   Skin:     General: Skin is warm and dry.   Neurological:      General: No focal deficit present.      Mental Status: He is alert and oriented to person, place, and time.   Psychiatric:         Mood and Affect: Mood normal.          RESULTS     LAB:  All pertinent labs reviewed and interpreted  Labs Ordered and Resulted from Time of ED Arrival to Time of ED Departure   ROUTINE UA WITH MICROSCOPIC REFLEX TO CULTURE - Abnormal       Result Value    Color Urine Yellow      Appearance Urine Turbid (*)     Glucose Urine Negative      Bilirubin Urine Negative      Ketones Urine Negative      Specific Gravity Urine 1.024      Blood Urine >1.0 mg/dL (*)     pH Urine 5.5      Protein Albumin Urine 50 (*)     Urobilinogen Urine <2.0      Nitrite Urine Negative      Leukocyte Esterase Urine 25 Enrique/uL (*)     Bacteria Urine Many (*)     Budding Yeast Urine Many (*)     RBC Urine >182 (*)     WBC Urine 10 (*)    BASIC METABOLIC PANEL - Abnormal    Sodium 141      Potassium 4.2      Chloride 103      Carbon Dioxide (CO2) 26      Anion Gap 12      Urea Nitrogen 14.0      Creatinine 1.07      GFR Estimate 84      Calcium 9.2      Glucose 113 (*)    CBC WITH PLATELETS AND DIFFERENTIAL    WBC Count 7.8      RBC Count 5.42      Hemoglobin 16.3      Hematocrit 46.4      MCV 86      MCH 30.1      MCHC 35.1      RDW 12.0      Platelet Count 263      % Neutrophils 59      % Lymphocytes 31      % Monocytes 7      % Eosinophils 1      % Basophils 1      % Immature Granulocytes 0       NRBCs per 100 WBC 0      Absolute Neutrophils 4.6      Absolute Lymphocytes 2.4      Absolute Monocytes 0.5      Absolute Eosinophils 0.1      Absolute Basophils 0.1      Absolute Immature Granulocytes 0.0      Absolute NRBCs 0.0         RADIOLOGY:  CT Abdomen Pelvis w/o Contrast   Final Result   IMPRESSION:    1.  Obstructing 0.3 cm calculus in the distal right ureter.      2.  Hepatic steatosis.             ECG:  Performed at: 1517 at 7/16/24    Impression: Sinus bradycardia, without evidence of ST elevation or hyperacute T waves    Rate: 59 bpm  Rhythm: Sinus  Axis: Normal  TN Interval: 160 ms  QRS Interval: 94 ms  QTc Interval: 451 ms  ST Changes: None  Comparison: N/A    EKG results reviewed and interpreted by Dr. Maurice ED MD and Mckenna Atwood PA-C.       Mckenna Atwood PA-C  Emergency Medicine   [facility] EMERGENCY DEPARTMENT       Mckenna Atwood PA-C  07/16/24 0977

## 2024-07-16 NOTE — ED PROVIDER NOTES
"Emergency Department Midlevel Supervisory Note     I had a face to face encounter with this patient seen by the Advanced Practice Provider (AMEENA). I personally made/approved the management plan and take responsibility for the patient management. I personally saw patient and performed a substantive portion of the visit including all aspects of the medical decision making.     ED Course:  2:40 PM Mckenna Atwood PA-C staffed patient with me. I agree with their assessment and plan of management, and I will see the patient.  3:15 PM I met with the patient to introduce myself, gather additional history, perform my initial exam, and discuss the plan.     Brief HPI:     Clint Alvarez is a 49 year old male who presents for evaluation of flank pain. At 0900, the patient had sudden onset right flank pain and dark urination.  Patient has a history of kidney stones and says this feels somewhat similar.  Reports pain is in his right posterior flank with some radiation to the right groin.  Has had nausea and vomiting related to the pain.  Prior to onset of pain did not have any dysuria or hematuria.  No fevers.  No chest pain.  Has not required stents in the past related to his kidney stones.    I, Israel Diane, am serving as a scribe to document services personally performed by Nathan Richards MD, based on my observations and the provider's statements to me.   I, Nahtan Richards MD attest that Israel Diane was acting in a scribe capacity, has observed my performance of the services and has documented them in accordance with my direction.    Brief Physical Exam: /76   Pulse (!) 47   Temp 97.2  F (36.2  C) (Temporal)   Resp 11   Ht 1.676 m (5' 6\")   Wt 107.2 kg (236 lb 5.3 oz)   SpO2 94%   BMI 38.15 kg/m    Constitutional:  Alert, in no acute distress,  EYES: Conjunctivae clear  HENT:  Atraumatic  Respiratory:  Respirations even, unlabored, in no acute respiratory distress  Cardiovascular:  Regular rate and rhythm, good " peripheral perfusion  GI:  soft, nondistended, minimal tenderness in the right lower abdomen and right flank, no CVA tenderness  Musculoskeletal:  Moves all 4 extremities equally, grossly symmetrical strength  Integument: Warm & dry. No appreciable rash, erythema.  Neurologic:  Alert & oriented, speech clear and fluent, no focal deficits noted  Psych: Normal mood and affect         MDM:  Patient is a 49-year-old male with history of kidney stones presented to the emergency department with sudden onset severe right-sided flank and abdominal pain.  Upon evaluation here he is hemodynamically stable but slightly bradycardic.  Saturating well on room air.  Minimal tenderness in the right flank and right lower abdomen on exam.  Will obtain urinalysis as well as a CT abdomen pelvis to evaluate for signs of nephroureterolithiasis or other acute intra-abdominal process.    Labs reviewed and interpreted myself.  CBC shows no significant leukocytosis or anemia.  BMP shows normal creatinine and electrolytes.  Urinalysis does potentially represent UTI versus contaminated specimen as there are many yeast in addition to bacteria and white cells.    CT abdomen pelvis demonstrates a 3 mm stone in the distal right ureter Which I suspect is causing patient's pain.  After receiving Toradol, morphine he had significant improvement in symptoms.    AMEENA discussed with urology and they plan for close follow-up in clinic.  Will give an empiric dose of ceftriaxone here and then wait to see if cultures grow off a single colony forming species before prescribing prolonged course of antibiotics.    Otherwise patient's symptoms are well-controlled and he feels comfortable discharge home.  Signs symptoms of infection were discussed and return precautions given.       1. Ureterolithiasis    2. Complicated UTI (urinary tract infection)        Consults:      Labs and Imaging:  Results for orders placed or performed during the hospital encounter of  07/16/24   CT Abdomen Pelvis w/o Contrast    Impression    IMPRESSION:   1.  Obstructing 0.3 cm calculus in the distal right ureter.    2.  Hepatic steatosis.     UA with Microscopic reflex to Culture    Specimen: Urine, Clean Catch   Result Value Ref Range    Color Urine Yellow Colorless, Straw, Light Yellow, Yellow    Appearance Urine Turbid (A) Clear    Glucose Urine Negative Negative mg/dL    Bilirubin Urine Negative Negative    Ketones Urine Negative Negative mg/dL    Specific Gravity Urine 1.024 1.001 - 1.030    Blood Urine >1.0 mg/dL (A) Negative    pH Urine 5.5 5.0 - 7.0    Protein Albumin Urine 50 (A) Negative mg/dL    Urobilinogen Urine <2.0 <2.0 mg/dL    Nitrite Urine Negative Negative    Leukocyte Esterase Urine 25 Enrique/uL (A) Negative    Bacteria Urine Many (A) None Seen /HPF    Budding Yeast Urine Many (A) None Seen /HPF    RBC Urine >182 (H) <=2 /HPF    WBC Urine 10 (H) <=5 /HPF   Extra Blue Top Tube   Result Value Ref Range    Hold Specimen JIC    Extra Red Top Tube   Result Value Ref Range    Hold Specimen JIC    Extra Green Top (Lithium Heparin) Tube   Result Value Ref Range    Hold Specimen JIC    Extra Purple Top Tube   Result Value Ref Range    Hold Specimen JIC    Basic metabolic panel   Result Value Ref Range    Sodium 141 135 - 145 mmol/L    Potassium 4.2 3.4 - 5.3 mmol/L    Chloride 103 98 - 107 mmol/L    Carbon Dioxide (CO2) 26 22 - 29 mmol/L    Anion Gap 12 7 - 15 mmol/L    Urea Nitrogen 14.0 6.0 - 20.0 mg/dL    Creatinine 1.07 0.67 - 1.17 mg/dL    GFR Estimate 84 >60 mL/min/1.73m2    Calcium 9.2 8.8 - 10.4 mg/dL    Glucose 113 (H) 70 - 99 mg/dL   CBC with platelets and differential   Result Value Ref Range    WBC Count 7.8 4.0 - 11.0 10e3/uL    RBC Count 5.42 4.40 - 5.90 10e6/uL    Hemoglobin 16.3 13.3 - 17.7 g/dL    Hematocrit 46.4 40.0 - 53.0 %    MCV 86 78 - 100 fL    MCH 30.1 26.5 - 33.0 pg    MCHC 35.1 31.5 - 36.5 g/dL    RDW 12.0 10.0 - 15.0 %    Platelet Count 263 150 - 450 10e3/uL     % Neutrophils 59 %    % Lymphocytes 31 %    % Monocytes 7 %    % Eosinophils 1 %    % Basophils 1 %    % Immature Granulocytes 0 %    NRBCs per 100 WBC 0 <1 /100    Absolute Neutrophils 4.6 1.6 - 8.3 10e3/uL    Absolute Lymphocytes 2.4 0.8 - 5.3 10e3/uL    Absolute Monocytes 0.5 0.0 - 1.3 10e3/uL    Absolute Eosinophils 0.1 0.0 - 0.7 10e3/uL    Absolute Basophils 0.1 0.0 - 0.2 10e3/uL    Absolute Immature Granulocytes 0.0 <=0.4 10e3/uL    Absolute NRBCs 0.0 10e3/uL       I have reviewed the relevant laboratory studies above.    I independently interpreted the following imaging study(s):       EKG: I reviewed and independently interpreted the patient's EKG, with comments made as listed below. Please see scanned EKG for full report.       Procedures:  I was present for the key portions of procedures documented in AMEENA/midlevel note, see midlevel note for further details.    Nathan Richards MD  [facility] EMERGENCY DEPARTMENT  [address] [place] [id number]  [phone]     Nathan Richards MD  07/16/24 1184

## 2024-07-16 NOTE — DISCHARGE INSTRUCTIONS
You are seen in the emergency department today for right-sided flank pain.  Your UA did show signs of infection and the CT did show an obstructing stone in your right ureter.  After speaking with [facility], they feel comfortable discharging you home with oral pain medicines and plan for outpatient follow-up with them.  If you begin to experience fever, chills, nausea, vomiting, worsening pain despite medication management please return to the emergency department for further workup.    For your pain we recommend trying;  Tylenol 1,000mg every 6 hours (as needed), up to 4,000mg/day  Ibuprofen 600 to 800mg every 6 hours (as needed), up to 3,200mg/day

## 2024-07-16 NOTE — ED TRIAGE NOTES
"Pt arrives to ED ambulatory via private vehicle from home with friend.    Pt reports right flank pain that started this morning around 0900. Pt reports painful, dark urination. History of kidney stones and this feels similar.    Pt oriented to department, standard department flow, and updated on immediate plan of care.     BP (!) 152/86   Pulse (!) 48   Temp 97.2  F (36.2  C) (Temporal)   Resp 16   Ht 1.676 m (5' 6\")   Wt 107.2 kg (236 lb 5.3 oz)   SpO2 97%   BMI 38.15 kg/m        Triage Assessment (Adult)       Row Name 07/16/24 1217          Triage Assessment    Airway WDL WDL        Respiratory WDL    Respiratory WDL WDL        Skin Circulation/Temperature WDL    Skin Circulation/Temperature WDL WDL        Cardiac WDL    Cardiac WDL WDL        Peripheral/Neurovascular WDL    Peripheral Neurovascular WDL WDL        Cognitive/Neuro/Behavioral WDL    Cognitive/Neuro/Behavioral WDL WDL                     "

## 2024-07-24 LAB
ATRIAL RATE - MUSE: 59 BPM
DIASTOLIC BLOOD PRESSURE - MUSE: 88 MMHG
INTERPRETATION ECG - MUSE: NORMAL
P AXIS - MUSE: 19 DEGREES
PR INTERVAL - MUSE: 160 MS
QRS DURATION - MUSE: 94 MS
QT - MUSE: 456 MS
QTC - MUSE: 451 MS
R AXIS - MUSE: 6 DEGREES
SYSTOLIC BLOOD PRESSURE - MUSE: 183 MMHG
T AXIS - MUSE: 10 DEGREES
VENTRICULAR RATE- MUSE: 59 BPM

## 2024-10-26 ASSESSMENT — PATIENT HEALTH QUESTIONNAIRE - PHQ9: SUM OF ALL RESPONSES TO PHQ QUESTIONS 1-9: 19

## 2024-12-21 ENCOUNTER — HEALTH MAINTENANCE LETTER (OUTPATIENT)
Age: 49
End: 2024-12-21

## 2025-05-29 ENCOUNTER — TELEPHONE (OUTPATIENT)
Dept: FAMILY MEDICINE | Facility: OTHER | Age: 50
End: 2025-05-29
Payer: COMMERCIAL

## 2025-05-29 NOTE — TELEPHONE ENCOUNTER
INCOMING FORMS    Sender: Alex    Type of Form, letter or note (What is requested?): order    How was the form received?: Fax    How should forms be returned?:  Fax : [phone]    Form placed in JR bin for review/signature if appropriate.

## 2025-05-30 NOTE — TELEPHONE ENCOUNTER
I last saw patient in 2020. I am not able to fill out this form without visit. Please reach out to help him schedule this.     Israel Matthews PA-C on 5/30/2025 at 7:25 AM

## (undated) DEVICE — ENDO FORCEP SPIKED SERRATED SHAFT JUMBO 239CM G56998

## (undated) DEVICE — SUCTION MANIFOLD NEPTUNE 2 SYS 1 PORT 702-025-000

## (undated) DEVICE — TUBING SUCTION 12"X1/4" N612

## (undated) DEVICE — SPECIMEN CONTAINER 3OZ W/FORMALIN 59901

## (undated) DEVICE — GOWN IMPERVIOUS 2XL BLUE

## (undated) DEVICE — SNARE CAPIVATOR ROUND COLD SNR BX10 M00561101

## (undated) DEVICE — KIT ENDO TURNOVER/PROCEDURE CARRY-ON 101822

## (undated) DEVICE — SOL WATER IRRIG 500ML BOTTLE 2F7113